# Patient Record
Sex: FEMALE | Race: WHITE | NOT HISPANIC OR LATINO | Employment: OTHER | URBAN - METROPOLITAN AREA
[De-identification: names, ages, dates, MRNs, and addresses within clinical notes are randomized per-mention and may not be internally consistent; named-entity substitution may affect disease eponyms.]

---

## 2017-10-26 ENCOUNTER — HOSPITAL ENCOUNTER (OUTPATIENT)
Dept: FAMILY MEDICINE CLINIC | Facility: CLINIC | Age: 82
Setting detail: SPECIMEN
Discharge: HOME | End: 2017-10-26
Attending: FAMILY MEDICINE | Admitting: FAMILY MEDICINE

## 2017-10-26 LAB
ALBUMIN SERPL-MCNC: 3.7 G/DL (ref 3.5–4.8)
ALBUMIN/GLOB SERPL: 1.1 {RATIO} (ref 1–1.7)
ALP SERPL-CCNC: 79 IU/L (ref 32–91)
ALT SERPL-CCNC: 16 IU/L (ref 14–54)
ANION GAP SERPL CALC-SCNC: 11.3 MMOL/L (ref 10–20)
AST SERPL-CCNC: 23 IU/L (ref 15–41)
BILIRUB SERPL-MCNC: 0.3 MG/DL (ref 0.3–1.2)
BUN SERPL-MCNC: 12 MG/DL (ref 8–20)
BUN/CREAT SERPL: 17.1 (ref 5.4–26.2)
CALCIUM SERPL-MCNC: 9.5 MG/DL (ref 8.9–10.3)
CHLORIDE SERPL-SCNC: 100 MMOL/L (ref 101–111)
CONV CO2: 27 MMOL/L (ref 22–32)
CONV TOTAL PROTEIN: 7 G/DL (ref 6.1–7.9)
CREAT UR-MCNC: 0.7 MG/DL (ref 0.4–1)
GLOBULIN UR ELPH-MCNC: 3.3 G/DL (ref 2.5–3.8)
GLUCOSE SERPL-MCNC: 138 MG/DL (ref 65–99)
POTASSIUM SERPL-SCNC: 4.3 MMOL/L (ref 3.6–5.1)
SODIUM SERPL-SCNC: 134 MMOL/L (ref 136–144)

## 2018-04-24 ENCOUNTER — HOSPITAL ENCOUNTER (OUTPATIENT)
Dept: FAMILY MEDICINE CLINIC | Facility: CLINIC | Age: 83
Setting detail: SPECIMEN
Discharge: HOME | End: 2018-04-24
Attending: FAMILY MEDICINE | Admitting: FAMILY MEDICINE

## 2018-04-24 LAB
ALBUMIN SERPL-MCNC: 3.5 G/DL (ref 3.5–4.8)
ALBUMIN/GLOB SERPL: 1.2 {RATIO} (ref 1–1.7)
ALP SERPL-CCNC: 86 IU/L (ref 32–91)
ALT SERPL-CCNC: 21 IU/L (ref 14–54)
ANION GAP SERPL CALC-SCNC: 10.1 MMOL/L (ref 10–20)
AST SERPL-CCNC: 26 IU/L (ref 15–41)
BASOPHILS # BLD AUTO: 0.1 10*3/UL (ref 0–0.2)
BASOPHILS NFR BLD AUTO: 1 % (ref 0–2)
BILIRUB SERPL-MCNC: 0.4 MG/DL (ref 0.3–1.2)
BUN SERPL-MCNC: 14 MG/DL (ref 8–20)
BUN/CREAT SERPL: 20 (ref 5.4–26.2)
CALCIUM SERPL-MCNC: 9.2 MG/DL (ref 8.9–10.3)
CHLORIDE SERPL-SCNC: 104 MMOL/L (ref 101–111)
CONV CO2: 26 MMOL/L (ref 22–32)
CONV TOTAL PROTEIN: 6.4 G/DL (ref 6.1–7.9)
CREAT UR-MCNC: 0.7 MG/DL (ref 0.4–1)
DIFFERENTIAL METHOD BLD: (no result)
EOSINOPHIL # BLD AUTO: 0.2 10*3/UL (ref 0–0.3)
EOSINOPHIL # BLD AUTO: 3 % (ref 0–3)
ERYTHROCYTE [DISTWIDTH] IN BLOOD BY AUTOMATED COUNT: 14.4 % (ref 11.5–14.5)
GLOBULIN UR ELPH-MCNC: 2.9 G/DL (ref 2.5–3.8)
GLUCOSE SERPL-MCNC: 128 MG/DL (ref 65–99)
HCT VFR BLD AUTO: 37.6 % (ref 35–49)
HGB BLD-MCNC: 12.4 G/DL (ref 12–15)
LYMPHOCYTES # BLD AUTO: 1.6 10*3/UL (ref 0.8–4.8)
LYMPHOCYTES NFR BLD AUTO: 28 % (ref 18–42)
MCH RBC QN AUTO: 29.6 PG (ref 26–32)
MCHC RBC AUTO-ENTMCNC: 33 G/DL (ref 32–36)
MCV RBC AUTO: 89.8 FL (ref 80–94)
MONOCYTES # BLD AUTO: 0.5 10*3/UL (ref 0.1–1.3)
MONOCYTES NFR BLD AUTO: 9 % (ref 2–11)
NEUTROPHILS # BLD AUTO: 3.5 10*3/UL (ref 2.3–8.6)
NEUTROPHILS NFR BLD AUTO: 59 % (ref 50–75)
NRBC BLD AUTO-RTO: 0 /100{WBCS}
NRBC/RBC NFR BLD MANUAL: 0 10*3/UL
PLATELET # BLD AUTO: 191 10*3/UL (ref 150–450)
PMV BLD AUTO: 10.2 FL (ref 7.4–10.4)
POTASSIUM SERPL-SCNC: 4.1 MMOL/L (ref 3.6–5.1)
RBC # BLD AUTO: 4.18 10*6/UL (ref 4–5.4)
SODIUM SERPL-SCNC: 136 MMOL/L (ref 136–144)
WBC # BLD AUTO: 6 10*3/UL (ref 4.5–11.5)

## 2018-08-22 ENCOUNTER — HOSPITAL ENCOUNTER (OUTPATIENT)
Dept: FAMILY MEDICINE CLINIC | Facility: CLINIC | Age: 83
Setting detail: SPECIMEN
Discharge: HOME | End: 2018-08-22
Attending: FAMILY MEDICINE | Admitting: FAMILY MEDICINE

## 2018-08-22 LAB
ALBUMIN SERPL-MCNC: 3.3 G/DL (ref 3.5–4.8)
ALBUMIN/GLOB SERPL: 1 {RATIO} (ref 1–1.7)
ALP SERPL-CCNC: 94 IU/L (ref 32–91)
ALT SERPL-CCNC: 15 IU/L (ref 14–54)
ANION GAP SERPL CALC-SCNC: 10.6 MMOL/L (ref 10–20)
AST SERPL-CCNC: 22 IU/L (ref 15–41)
BASOPHILS # BLD AUTO: 0.1 10*3/UL (ref 0–0.2)
BASOPHILS NFR BLD AUTO: 1 % (ref 0–2)
BILIRUB SERPL-MCNC: 0.8 MG/DL (ref 0.3–1.2)
BILIRUB UR QL STRIP: NEGATIVE MG/DL
BUN SERPL-MCNC: 13 MG/DL (ref 8–20)
BUN/CREAT SERPL: 16.3 (ref 5.4–26.2)
CALCIUM SERPL-MCNC: 9 MG/DL (ref 8.9–10.3)
CASTS URNS QL MICRO: ABNORMAL /[LPF]
CHLORIDE SERPL-SCNC: 103 MMOL/L (ref 101–111)
COLOR UR: ABNORMAL
CONV BACTERIA IN URINE MICRO: NEGATIVE
CONV CLARITY OF URINE: CLEAR
CONV CO2: 26 MMOL/L (ref 22–32)
CONV HYALINE CASTS IN URINE MICRO: 1 /[LPF] (ref 0–5)
CONV PROTEIN IN URINE BY AUTOMATED TEST STRIP: ABNORMAL MG/DL
CONV SMALL ROUND CELLS: ABNORMAL /[HPF]
CONV TOTAL PROTEIN: 6.6 G/DL (ref 6.1–7.9)
CONV UROBILINOGEN IN URINE BY AUTOMATED TEST STRIP: 0.2 MG/DL
CREAT UR-MCNC: 0.8 MG/DL (ref 0.4–1)
CULTURE INDICATED?: ABNORMAL
DIFFERENTIAL METHOD BLD: (no result)
EOSINOPHIL # BLD AUTO: 0.1 10*3/UL (ref 0–0.3)
EOSINOPHIL # BLD AUTO: 2 % (ref 0–3)
ERYTHROCYTE [DISTWIDTH] IN BLOOD BY AUTOMATED COUNT: 13.6 % (ref 11.5–14.5)
GLOBULIN UR ELPH-MCNC: 3.3 G/DL (ref 2.5–3.8)
GLUCOSE SERPL-MCNC: 180 MG/DL (ref 65–99)
GLUCOSE UR QL: NEGATIVE MG/DL
HCT VFR BLD AUTO: 36.6 % (ref 35–49)
HGB BLD-MCNC: 12.5 G/DL (ref 12–15)
HGB UR QL STRIP: NEGATIVE
KETONES UR QL STRIP: NEGATIVE MG/DL
LEUKOCYTE ESTERASE UR QL STRIP: NEGATIVE
LYMPHOCYTES # BLD AUTO: 1.4 10*3/UL (ref 0.8–4.8)
LYMPHOCYTES NFR BLD AUTO: 19 % (ref 18–42)
MCH RBC QN AUTO: 31.2 PG (ref 26–32)
MCHC RBC AUTO-ENTMCNC: 34.2 G/DL (ref 32–36)
MCV RBC AUTO: 91.5 FL (ref 80–94)
MONOCYTES # BLD AUTO: 0.6 10*3/UL (ref 0.1–1.3)
MONOCYTES NFR BLD AUTO: 8 % (ref 2–11)
NEUTROPHILS # BLD AUTO: 5 10*3/UL (ref 2.3–8.6)
NEUTROPHILS NFR BLD AUTO: 70 % (ref 50–75)
NITRITE UR QL STRIP: NEGATIVE
NRBC BLD AUTO-RTO: 0 /100{WBCS}
NRBC/RBC NFR BLD MANUAL: 0 10*3/UL
PH UR STRIP.AUTO: 6 [PH] (ref 4.5–8)
PLATELET # BLD AUTO: 183 10*3/UL (ref 150–450)
PMV BLD AUTO: 10.2 FL (ref 7.4–10.4)
POTASSIUM SERPL-SCNC: 3.6 MMOL/L (ref 3.6–5.1)
RBC # BLD AUTO: 4 10*6/UL (ref 4–5.4)
RBC #/AREA URNS HPF: 3 /[HPF] (ref 0–3)
SODIUM SERPL-SCNC: 136 MMOL/L (ref 136–144)
SP GR UR: 1.02 (ref 1–1.03)
SPERM URNS QL MICRO: ABNORMAL /[HPF]
SQUAMOUS SPT QL MICRO: 2 /[HPF] (ref 0–5)
UNIDENT CRYS URNS QL MICRO: ABNORMAL /[HPF]
WBC # BLD AUTO: 7.1 10*3/UL (ref 4.5–11.5)
WBC #/AREA URNS HPF: 5 /[HPF] (ref 0–5)
YEAST SPEC QL WET PREP: ABNORMAL /[HPF]

## 2018-10-25 ENCOUNTER — HOSPITAL ENCOUNTER (OUTPATIENT)
Dept: FAMILY MEDICINE CLINIC | Facility: CLINIC | Age: 83
Setting detail: SPECIMEN
Discharge: HOME | End: 2018-10-25
Attending: FAMILY MEDICINE | Admitting: FAMILY MEDICINE

## 2018-10-25 LAB
ALBUMIN SERPL-MCNC: 3.7 G/DL (ref 3.5–4.8)
ALBUMIN/GLOB SERPL: 1.2 {RATIO} (ref 1–1.7)
ALP SERPL-CCNC: 102 IU/L (ref 32–91)
ALT SERPL-CCNC: 22 IU/L (ref 14–54)
ANION GAP SERPL CALC-SCNC: 11.8 MMOL/L (ref 10–20)
AST SERPL-CCNC: 24 IU/L (ref 15–41)
BILIRUB SERPL-MCNC: 0.6 MG/DL (ref 0.3–1.2)
BUN SERPL-MCNC: 15 MG/DL (ref 8–20)
BUN/CREAT SERPL: 25 (ref 5.4–26.2)
CALCIUM SERPL-MCNC: 9.2 MG/DL (ref 8.9–10.3)
CHLORIDE SERPL-SCNC: 104 MMOL/L (ref 101–111)
CONV CO2: 26 MMOL/L (ref 22–32)
CONV TOTAL PROTEIN: 6.7 G/DL (ref 6.1–7.9)
CREAT UR-MCNC: 0.6 MG/DL (ref 0.4–1)
GLOBULIN UR ELPH-MCNC: 3 G/DL (ref 2.5–3.8)
GLUCOSE SERPL-MCNC: 139 MG/DL (ref 65–99)
HBA1C MFR BLD: 7.5 % (ref 0–5.6)
POTASSIUM SERPL-SCNC: 3.8 MMOL/L (ref 3.6–5.1)
SODIUM SERPL-SCNC: 138 MMOL/L (ref 136–144)

## 2019-01-10 ENCOUNTER — EPISODE CHANGES (OUTPATIENT)
Dept: CASE MANAGEMENT | Facility: OTHER | Age: 84
End: 2019-01-10

## 2019-01-10 ENCOUNTER — PATIENT OUTREACH (OUTPATIENT)
Dept: CASE MANAGEMENT | Facility: OTHER | Age: 84
End: 2019-01-10

## 2019-01-10 NOTE — OUTREACH NOTE
Skilled Nursing Facility Discharge Flowsheet:     Skilled Nursing Facility Discharge Assessment 1/10/2019   Acute Facility Discharged From [facility]   Acute Discharge Date 1/8/2019   [facility]? [facility]   Purpose of SNF Admission PT;SN   Estimated length of stay for the patient? Discharge Planning Incomplete   Who is the insurance provider or payor of patient stay? Medicare   Progression of Patient? New Admission

## 2019-01-18 ENCOUNTER — PATIENT OUTREACH (OUTPATIENT)
Dept: CASE MANAGEMENT | Facility: OTHER | Age: 84
End: 2019-01-18

## 2019-01-18 NOTE — OUTREACH NOTE
Skilled Nursing Facility Discharge Flowsheet:     Skilled Nursing Facility Discharge Assessment 1/18/2019   Acute Facility Discharged From [facility]   Acute Discharge Date 1/8/2019   [facility] the [facility]   Purpose of SNF Admission PT;SN   Estimated length of stay for the patient? Discharge Planning Incomplete   Who is the insurance provider or payor of patient stay? Medicare   Progression of Patient? Within Medicare Limits

## 2019-01-23 ENCOUNTER — PATIENT OUTREACH (OUTPATIENT)
Dept: CASE MANAGEMENT | Facility: OTHER | Age: 84
End: 2019-01-23

## 2019-01-23 NOTE — OUTREACH NOTE
Skilled Nursing Facility Discharge Flowsheet:     Skilled Nursing Facility Discharge Assessment 1/23/2019   Acute Facility Discharged From [facility]   Acute Discharge Date 1/8/2019   [facility] the [facility]   Purpose of SNF Admission PT;SN   Estimated length of stay for the patient? Discharge Planning Incomplete   Who is the insurance provider or payor of patient stay? Medicare   Progression of Patient? Within Medicare Limits

## 2019-03-22 ENCOUNTER — PATIENT OUTREACH (OUTPATIENT)
Dept: CASE MANAGEMENT | Facility: OTHER | Age: 84
End: 2019-03-22

## 2019-03-22 NOTE — OUTREACH NOTE
Skilled Nursing Facility Discharge Flowsheet:     Skilled Nursing Facility Discharge Assessment 3/22/2019   Acute Facility Discharged From [facility]   Acute Discharge Date 1/8/2019   [facility] the [facility]   Purpose of SNF Admission PT;SN   Estimated length of stay for the patient? Patient no longer residing at facility.   Who is the insurance provider or payor of patient stay? -   Progression of Patient? No one available to provide discharge dispositon to RN-Ca this dte.     RN-CA review of [place] and [facility] - No acute or PCP activity since January 2019.  Last activity was correspondence between PCP and Insurance provider relative to Long Term Care coverage.

## 2019-11-23 ENCOUNTER — APPOINTMENT (OUTPATIENT)
Dept: GENERAL RADIOLOGY | Facility: HOSPITAL | Age: 84
End: 2019-11-23

## 2019-11-23 ENCOUNTER — APPOINTMENT (OUTPATIENT)
Dept: CT IMAGING | Facility: HOSPITAL | Age: 84
End: 2019-11-23

## 2019-11-23 ENCOUNTER — HOSPITAL ENCOUNTER (EMERGENCY)
Facility: HOSPITAL | Age: 84
Discharge: HOME | End: 2019-11-23
Attending: EMERGENCY MEDICINE | Admitting: EMERGENCY MEDICINE

## 2019-11-23 VITALS
DIASTOLIC BLOOD PRESSURE: 78 MMHG | BODY MASS INDEX: 20.28 KG/M2 | TEMPERATURE: 97.4 F | RESPIRATION RATE: 18 BRPM | HEART RATE: 70 BPM | WEIGHT: 110.23 LBS | SYSTOLIC BLOOD PRESSURE: 141 MMHG | OXYGEN SATURATION: 99 % | HEIGHT: 62 IN

## 2019-11-23 DIAGNOSIS — S13.9XXA NECK SPRAIN, INITIAL ENCOUNTER: ICD-10-CM

## 2019-11-23 DIAGNOSIS — S70.01XA CONTUSION OF RIGHT HIP, INITIAL ENCOUNTER: ICD-10-CM
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
## 2019-11-23 DIAGNOSIS — M25.551 RIGHT HIP PAIN: ICD-10-CM

## 2019-11-23 DIAGNOSIS — S09.90XA CLOSED HEAD INJURY WITHOUT LOSS OF CONSCIOUSNESS, INITIAL ENCOUNTER: ICD-10-CM

## 2019-11-23 LAB — GLUCOSE BLDC GLUCOMTR-MCNC: 161 MG/DL (ref 70–105)

## 2019-11-23 PROCEDURE — 72125 CT NECK SPINE W/O DYE: CPT

## 2019-11-23 PROCEDURE — 82962 GLUCOSE BLOOD TEST: CPT

## 2019-11-23 PROCEDURE — 73502 X-RAY EXAM HIP UNI 2-3 VIEWS: CPT

## 2019-11-23 PROCEDURE — 70450 CT HEAD/BRAIN W/O DYE: CPT

## 2019-11-23 PROCEDURE — 99283 EMERGENCY DEPT VISIT LOW MDM: CPT

## 2019-11-23 NOTE — DISCHARGE INSTRUCTIONS
Watch the patient closely returning for any episode of confusion lethargy or repetitive vomiting.  Tylenol or Motrin as needed for pain.  Ice to any areas of pain or contusion on for 15 minutes off for 15 minutes over the next 24 hours.  See PMD in 2 days for recheck.  See PMD in 10 to 14 days if hip pain persist for repeat evaluation to rule out occult fracture or injury.

## 2019-12-25 ENCOUNTER — APPOINTMENT (OUTPATIENT)
Dept: CT IMAGING | Facility: HOSPITAL | Age: 84
End: 2019-12-25

## 2019-12-25 ENCOUNTER — APPOINTMENT (OUTPATIENT)
Dept: GENERAL RADIOLOGY | Facility: HOSPITAL | Age: 84
End: 2019-12-25

## 2019-12-25 ENCOUNTER — HOSPITAL ENCOUNTER (OUTPATIENT)
Facility: HOSPITAL | Age: 84
Setting detail: OBSERVATION
Discharge: HOME HEALTH | End: 2019-12-26
Attending: EMERGENCY MEDICINE | Admitting: HOSPITALIST

## 2019-12-25 DIAGNOSIS — R53.1 GENERAL WEAKNESS: ICD-10-CM
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
## 2019-12-25 DIAGNOSIS — S01.81XA LACERATION OF FOREHEAD, INITIAL ENCOUNTER: ICD-10-CM

## 2019-12-25 PROBLEM — I10 HYPERTENSION: Status: ACTIVE | Noted: 2019-12-25

## 2019-12-25 PROBLEM — E11.9 DIABETES MELLITUS (HCC): Status: ACTIVE | Noted: 2019-12-25

## 2019-12-25 PROBLEM — S09.90XA HEAD INJURY, CLOSED, INITIAL ENCOUNTER: Status: ACTIVE | Noted: 2019-12-25

## 2019-12-25 PROBLEM — F03.90 DEMENTIA (HCC): Status: ACTIVE | Noted: 2019-12-25

## 2019-12-25 LAB
ANION GAP SERPL CALCULATED.3IONS-SCNC: 12 MMOL/L (ref 5–15)
APTT PPP: 22.3 SECONDS (ref 24–31)
BASOPHILS # BLD AUTO: 0.1 10*3/MM3 (ref 0–0.2)
BASOPHILS NFR BLD AUTO: 1.1 % (ref 0–1.5)
BILIRUB UR QL STRIP: NEGATIVE
BUN BLD-MCNC: 12 MG/DL (ref 8–23)
BUN/CREAT SERPL: 17.9 (ref 7–25)
CALCIUM SPEC-SCNC: 9.3 MG/DL (ref 8.2–9.6)
CHLORIDE SERPL-SCNC: 99 MMOL/L (ref 98–107)
CLARITY UR: CLEAR
CO2 SERPL-SCNC: 26 MMOL/L (ref 22–29)
COLOR UR: YELLOW
CREAT BLD-MCNC: 0.67 MG/DL (ref 0.57–1)
DEPRECATED RDW RBC AUTO: 48.6 FL (ref 37–54)
EOSINOPHIL # BLD AUTO: 0.2 10*3/MM3 (ref 0–0.4)
EOSINOPHIL NFR BLD AUTO: 3.6 % (ref 0.3–6.2)
ERYTHROCYTE [DISTWIDTH] IN BLOOD BY AUTOMATED COUNT: 16.2 % (ref 12.3–15.4)
GFR SERPL CREATININE-BSD FRML MDRD: 82 ML/MIN/1.73
GLUCOSE BLD-MCNC: 196 MG/DL (ref 65–99)
GLUCOSE UR STRIP-MCNC: ABNORMAL MG/DL
HCT VFR BLD AUTO: 37 % (ref 34–46.6)
HGB BLD-MCNC: 12.6 G/DL (ref 12–15.9)
HGB UR QL STRIP.AUTO: NEGATIVE
HOLD SPECIMEN: NORMAL
HOLD SPECIMEN: NORMAL
INR PPP: 1.06 (ref 0.9–1.1)
KETONES UR QL STRIP: NEGATIVE
LEUKOCYTE ESTERASE UR QL STRIP.AUTO: NEGATIVE
LYMPHOCYTES # BLD AUTO: 1.1 10*3/MM3 (ref 0.7–3.1)
LYMPHOCYTES NFR BLD AUTO: 19.2 % (ref 19.6–45.3)
MCH RBC QN AUTO: 29.2 PG (ref 26.6–33)
MCHC RBC AUTO-ENTMCNC: 34.2 G/DL (ref 31.5–35.7)
MCV RBC AUTO: 85.4 FL (ref 79–97)
MONOCYTES # BLD AUTO: 0.4 10*3/MM3 (ref 0.1–0.9)
MONOCYTES NFR BLD AUTO: 6.5 % (ref 5–12)
NEUTROPHILS # BLD AUTO: 3.9 10*3/MM3 (ref 1.7–7)
NEUTROPHILS NFR BLD AUTO: 69.6 % (ref 42.7–76)
NITRITE UR QL STRIP: NEGATIVE
NRBC BLD AUTO-RTO: 0.1 /100 WBC (ref 0–0.2)
PH UR STRIP.AUTO: 7.5 [PH] (ref 5–8)
PLATELET # BLD AUTO: 175 10*3/MM3 (ref 140–450)
PMV BLD AUTO: 8.4 FL (ref 6–12)
POTASSIUM BLD-SCNC: 3.9 MMOL/L (ref 3.5–5.2)
PROT UR QL STRIP: NEGATIVE
PROTHROMBIN TIME: 11 SECONDS (ref 9.6–11.7)
RBC # BLD AUTO: 4.33 10*6/MM3 (ref 3.77–5.28)
SODIUM BLD-SCNC: 137 MMOL/L (ref 136–145)
SP GR UR STRIP: 1.01 (ref 1–1.03)
TROPONIN T SERPL-MCNC: <0.01 NG/ML (ref 0–0.03)
UROBILINOGEN UR QL STRIP: ABNORMAL
WBC NRBC COR # BLD: 5.7 10*3/MM3 (ref 3.4–10.8)

## 2019-12-25 PROCEDURE — 85610 PROTHROMBIN TIME: CPT | Performed by: EMERGENCY MEDICINE

## 2019-12-25 PROCEDURE — 90471 IMMUNIZATION ADMIN: CPT | Performed by: EMERGENCY MEDICINE

## 2019-12-25 PROCEDURE — G0378 HOSPITAL OBSERVATION PER HR: HCPCS

## 2019-12-25 PROCEDURE — 93005 ELECTROCARDIOGRAM TRACING: CPT | Performed by: EMERGENCY MEDICINE

## 2019-12-25 PROCEDURE — 70450 CT HEAD/BRAIN W/O DYE: CPT

## 2019-12-25 PROCEDURE — P9612 CATHETERIZE FOR URINE SPEC: HCPCS

## 2019-12-25 PROCEDURE — 99220 PR INITIAL OBSERVATION CARE/DAY 70 MINUTES: CPT | Performed by: HOSPITALIST

## 2019-12-25 PROCEDURE — 25010000002 TDAP 5-2.5-18.5 LF-MCG/0.5 SUSPENSION: Performed by: EMERGENCY MEDICINE

## 2019-12-25 PROCEDURE — 85025 COMPLETE CBC W/AUTO DIFF WBC: CPT | Performed by: EMERGENCY MEDICINE

## 2019-12-25 PROCEDURE — 36415 COLL VENOUS BLD VENIPUNCTURE: CPT

## 2019-12-25 PROCEDURE — 81003 URINALYSIS AUTO W/O SCOPE: CPT | Performed by: EMERGENCY MEDICINE

## 2019-12-25 PROCEDURE — 72125 CT NECK SPINE W/O DYE: CPT

## 2019-12-25 PROCEDURE — 90715 TDAP VACCINE 7 YRS/> IM: CPT | Performed by: EMERGENCY MEDICINE

## 2019-12-25 PROCEDURE — 80048 BASIC METABOLIC PNL TOTAL CA: CPT | Performed by: EMERGENCY MEDICINE

## 2019-12-25 PROCEDURE — 85730 THROMBOPLASTIN TIME PARTIAL: CPT | Performed by: EMERGENCY MEDICINE

## 2019-12-25 PROCEDURE — 99284 EMERGENCY DEPT VISIT MOD MDM: CPT

## 2019-12-25 PROCEDURE — 72170 X-RAY EXAM OF PELVIS: CPT

## 2019-12-25 PROCEDURE — 84484 ASSAY OF TROPONIN QUANT: CPT | Performed by: EMERGENCY MEDICINE

## 2019-12-25 PROCEDURE — 71045 X-RAY EXAM CHEST 1 VIEW: CPT

## 2019-12-25 RX ORDER — ACETAMINOPHEN 325 MG/1
650 TABLET ORAL EVERY 6 HOURS PRN
Status: DISCONTINUED | OUTPATIENT
Start: 2019-12-25 | End: 2019-12-26 | Stop reason: HOSPADM

## 2019-12-25 RX ORDER — BRIMONIDINE TARTRATE 2 MG/ML
1 SOLUTION/ DROPS OPHTHALMIC 2 TIMES DAILY
COMMUNITY

## 2019-12-25 RX ORDER — BRIMONIDINE TARTRATE 2 MG/ML
1 SOLUTION/ DROPS OPHTHALMIC 2 TIMES DAILY
Status: DISCONTINUED | OUTPATIENT
Start: 2019-12-25 | End: 2019-12-26 | Stop reason: HOSPADM

## 2019-12-25 RX ORDER — CHOLECALCIFEROL (VITAMIN D3) 125 MCG
5000 CAPSULE ORAL EVERY MORNING
Status: DISCONTINUED | OUTPATIENT
Start: 2019-12-26 | End: 2019-12-25 | Stop reason: CLARIF

## 2019-12-25 RX ORDER — POLYETHYLENE GLYCOL 3350 17 G/17G
17 POWDER, FOR SOLUTION ORAL DAILY
COMMUNITY
End: 2020-10-17

## 2019-12-25 RX ORDER — ACETAMINOPHEN 500 MG
500 TABLET ORAL EVERY 6 HOURS PRN
Status: DISCONTINUED | OUTPATIENT
Start: 2019-12-25 | End: 2019-12-26 | Stop reason: HOSPADM

## 2019-12-25 RX ORDER — DORZOLAMIDE HYDROCHLORIDE AND TIMOLOL MALEATE 20; 5 MG/ML; MG/ML
1 SOLUTION/ DROPS OPHTHALMIC 2 TIMES DAILY
Status: DISCONTINUED | OUTPATIENT
Start: 2019-12-25 | End: 2019-12-25 | Stop reason: CLARIF

## 2019-12-25 RX ORDER — ONDANSETRON 2 MG/ML
4 INJECTION INTRAMUSCULAR; INTRAVENOUS EVERY 6 HOURS PRN
Status: DISCONTINUED | OUTPATIENT
Start: 2019-12-25 | End: 2019-12-26 | Stop reason: HOSPADM

## 2019-12-25 RX ORDER — AMLODIPINE BESYLATE 5 MG/1
7.5 TABLET ORAL DAILY
Status: DISCONTINUED | OUTPATIENT
Start: 2019-12-25 | End: 2019-12-26 | Stop reason: HOSPADM

## 2019-12-25 RX ORDER — POLYETHYLENE GLYCOL 3350 17 G/17G
17 POWDER, FOR SOLUTION ORAL DAILY PRN
Status: DISCONTINUED | OUTPATIENT
Start: 2019-12-25 | End: 2019-12-26 | Stop reason: HOSPADM

## 2019-12-25 RX ORDER — METFORMIN HYDROCHLORIDE 500 MG/1
500 TABLET, EXTENDED RELEASE ORAL NIGHTLY
Status: DISCONTINUED | OUTPATIENT
Start: 2019-12-25 | End: 2019-12-26 | Stop reason: HOSPADM

## 2019-12-25 RX ORDER — POLYETHYLENE GLYCOL 3350 17 G/17G
17 POWDER, FOR SOLUTION ORAL DAILY
Status: DISCONTINUED | OUTPATIENT
Start: 2019-12-25 | End: 2019-12-26 | Stop reason: HOSPADM

## 2019-12-25 RX ORDER — DONEPEZIL HYDROCHLORIDE 10 MG/1
10 TABLET, FILM COATED ORAL NIGHTLY
COMMUNITY
End: 2020-10-17

## 2019-12-25 RX ORDER — CARBOXYMETHYLCELLULOSE SODIUM 10 MG/ML
1 GEL OPHTHALMIC
Status: DISCONTINUED | OUTPATIENT
Start: 2019-12-25 | End: 2019-12-26 | Stop reason: HOSPADM

## 2019-12-25 RX ORDER — SODIUM CHLORIDE 0.9 % (FLUSH) 0.9 %
10 SYRINGE (ML) INJECTION AS NEEDED
Status: DISCONTINUED | OUTPATIENT
Start: 2019-12-25 | End: 2019-12-26 | Stop reason: HOSPADM

## 2019-12-25 RX ORDER — ACETAMINOPHEN 650 MG/1
650 SUPPOSITORY RECTAL EVERY 4 HOURS PRN
Status: DISCONTINUED | OUTPATIENT
Start: 2019-12-25 | End: 2019-12-26 | Stop reason: HOSPADM

## 2019-12-25 RX ORDER — ATORVASTATIN CALCIUM 10 MG/1
10 TABLET, FILM COATED ORAL DAILY
Status: DISCONTINUED | OUTPATIENT
Start: 2019-12-25 | End: 2019-12-26 | Stop reason: HOSPADM

## 2019-12-25 RX ORDER — ALUMINA, MAGNESIA, AND SIMETHICONE 2400; 2400; 240 MG/30ML; MG/30ML; MG/30ML
15 SUSPENSION ORAL EVERY 6 HOURS PRN
Status: DISCONTINUED | OUTPATIENT
Start: 2019-12-25 | End: 2019-12-26 | Stop reason: HOSPADM

## 2019-12-25 RX ORDER — DONEPEZIL HYDROCHLORIDE 5 MG/1
10 TABLET, FILM COATED ORAL NIGHTLY
Status: DISCONTINUED | OUTPATIENT
Start: 2019-12-25 | End: 2019-12-26 | Stop reason: HOSPADM

## 2019-12-25 RX ORDER — DOCUSATE SODIUM 100 MG/1
100 CAPSULE, LIQUID FILLED ORAL EVERY MORNING
COMMUNITY

## 2019-12-25 RX ORDER — SODIUM CHLORIDE 0.9 % (FLUSH) 0.9 %
10 SYRINGE (ML) INJECTION EVERY 12 HOURS SCHEDULED
Status: DISCONTINUED | OUTPATIENT
Start: 2019-12-25 | End: 2019-12-26 | Stop reason: HOSPADM

## 2019-12-25 RX ORDER — SACCHAROMYCES BOULARDII 250 MG
250 CAPSULE ORAL DAILY
Status: DISCONTINUED | OUTPATIENT
Start: 2019-12-26 | End: 2019-12-26 | Stop reason: HOSPADM

## 2019-12-25 RX ORDER — METFORMIN HYDROCHLORIDE 500 MG/1
500 TABLET, EXTENDED RELEASE ORAL NIGHTLY
COMMUNITY

## 2019-12-25 RX ORDER — CARBOXYMETHYLCELLULOSE SODIUM 5 MG/ML
1 SOLUTION/ DROPS OPHTHALMIC 4 TIMES DAILY
COMMUNITY

## 2019-12-25 RX ORDER — CETIRIZINE HYDROCHLORIDE 10 MG/1
10 TABLET ORAL DAILY
Status: DISCONTINUED | OUTPATIENT
Start: 2019-12-26 | End: 2019-12-26 | Stop reason: HOSPADM

## 2019-12-25 RX ORDER — LATANOPROST 50 UG/ML
1 SOLUTION/ DROPS OPHTHALMIC NIGHTLY
Status: DISCONTINUED | OUTPATIENT
Start: 2019-12-25 | End: 2019-12-26 | Stop reason: HOSPADM

## 2019-12-25 RX ORDER — ACETAMINOPHEN 325 MG/1
650 TABLET ORAL 2 TIMES DAILY
COMMUNITY

## 2019-12-25 RX ORDER — ONDANSETRON 4 MG/1
4 TABLET, FILM COATED ORAL EVERY 6 HOURS PRN
Status: DISCONTINUED | OUTPATIENT
Start: 2019-12-25 | End: 2019-12-26 | Stop reason: HOSPADM

## 2019-12-25 RX ORDER — ACETAMINOPHEN 160 MG/5ML
650 SOLUTION ORAL EVERY 4 HOURS PRN
Status: DISCONTINUED | OUTPATIENT
Start: 2019-12-25 | End: 2019-12-26 | Stop reason: HOSPADM

## 2019-12-25 RX ORDER — DORZOLAMIDE HCL 20 MG/ML
1 SOLUTION/ DROPS OPHTHALMIC 2 TIMES DAILY
Status: DISCONTINUED | OUTPATIENT
Start: 2019-12-25 | End: 2019-12-26 | Stop reason: HOSPADM

## 2019-12-25 RX ORDER — TIMOLOL MALEATE 5 MG/ML
1 SOLUTION/ DROPS OPHTHALMIC EVERY 12 HOURS SCHEDULED
Status: DISCONTINUED | OUTPATIENT
Start: 2019-12-25 | End: 2019-12-26 | Stop reason: HOSPADM

## 2019-12-25 RX ORDER — SIMVASTATIN 20 MG
20 TABLET ORAL NIGHTLY
COMMUNITY
End: 2020-10-17

## 2019-12-25 RX ORDER — PANTOPRAZOLE SODIUM 40 MG/1
40 TABLET, DELAYED RELEASE ORAL EVERY MORNING
Status: DISCONTINUED | OUTPATIENT
Start: 2019-12-26 | End: 2019-12-26 | Stop reason: HOSPADM

## 2019-12-25 RX ORDER — POLYETHYLENE GLYCOL 3350 17 G/17G
17 POWDER, FOR SOLUTION ORAL DAILY PRN
COMMUNITY

## 2019-12-25 RX ORDER — SACCHAROMYCES BOULARDII 250 MG
250 CAPSULE ORAL DAILY
COMMUNITY

## 2019-12-25 RX ORDER — MELATONIN
2000 DAILY
Status: DISCONTINUED | OUTPATIENT
Start: 2019-12-26 | End: 2019-12-26 | Stop reason: HOSPADM

## 2019-12-25 RX ORDER — AMLODIPINE BESYLATE 5 MG/1
7.5 TABLET ORAL DAILY
COMMUNITY

## 2019-12-25 RX ORDER — DORZOLAMIDE HYDROCHLORIDE AND TIMOLOL MALEATE 20; 5 MG/ML; MG/ML
1 SOLUTION/ DROPS OPHTHALMIC 2 TIMES DAILY
COMMUNITY

## 2019-12-25 RX ORDER — LORATADINE 10 MG/1
10 TABLET ORAL DAILY
COMMUNITY

## 2019-12-25 RX ORDER — HYDROCORTISONE ACETATE 25 MG/1
12.5 SUPPOSITORY RECTAL 2 TIMES DAILY
Status: DISCONTINUED | OUTPATIENT
Start: 2019-12-25 | End: 2019-12-26 | Stop reason: HOSPADM

## 2019-12-25 RX ORDER — OMEPRAZOLE 40 MG/1
40 CAPSULE, DELAYED RELEASE ORAL DAILY
COMMUNITY

## 2019-12-25 RX ORDER — DOCUSATE SODIUM 100 MG/1
100 CAPSULE, LIQUID FILLED ORAL EVERY MORNING
Status: DISCONTINUED | OUTPATIENT
Start: 2019-12-26 | End: 2019-12-26 | Stop reason: HOSPADM

## 2019-12-25 RX ORDER — ACETAMINOPHEN 500 MG
500 TABLET ORAL EVERY 6 HOURS PRN
COMMUNITY

## 2019-12-25 RX ORDER — ACETAMINOPHEN 325 MG/1
650 TABLET ORAL EVERY 4 HOURS PRN
Status: DISCONTINUED | OUTPATIENT
Start: 2019-12-25 | End: 2019-12-26 | Stop reason: HOSPADM

## 2019-12-25 RX ADMIN — ATORVASTATIN CALCIUM 10 MG: 10 TABLET, FILM COATED ORAL at 20:53

## 2019-12-25 RX ADMIN — POLYETHYLENE GLYCOL 3350 17 G: 17 POWDER, FOR SOLUTION ORAL at 20:53

## 2019-12-25 RX ADMIN — TETANUS TOXOID, REDUCED DIPHTHERIA TOXOID AND ACELLULAR PERTUSSIS VACCINE, ADSORBED 0.5 ML: 5; 2.5; 8; 8; 2.5 SUSPENSION INTRAMUSCULAR at 15:04

## 2019-12-25 RX ADMIN — LATANOPROST 1 DROP: 50 SOLUTION/ DROPS OPHTHALMIC at 21:31

## 2019-12-25 RX ADMIN — HYDROCORTISONE ACETATE 12.5 MG: 25 SUPPOSITORY RECTAL at 21:30

## 2019-12-25 RX ADMIN — AMLODIPINE BESYLATE 7.5 MG: 5 TABLET ORAL at 20:53

## 2019-12-25 RX ADMIN — DONEPEZIL HYDROCHLORIDE 10 MG: 5 TABLET, FILM COATED ORAL at 21:29

## 2019-12-25 RX ADMIN — TIMOLOL MALEATE 1 DROP: 5 SOLUTION/ DROPS OPHTHALMIC at 21:33

## 2019-12-25 RX ADMIN — DORZOLAMIDE HYDROCHLORIDE 1 DROP: 20 SOLUTION/ DROPS OPHTHALMIC at 21:29

## 2019-12-25 RX ADMIN — Medication 10 ML: at 21:32

## 2019-12-25 RX ADMIN — CARBOXYMETHYLCELLULOSE SODIUM 1 DROP: 10 GEL OPHTHALMIC at 21:28

## 2019-12-25 RX ADMIN — METFORMIN HYDROCHLORIDE 500 MG: 500 TABLET, EXTENDED RELEASE ORAL at 21:32

## 2019-12-25 RX ADMIN — BRIMONIDINE TARTRATE 1 DROP: 2 SOLUTION OPHTHALMIC at 21:25

## 2019-12-26 ENCOUNTER — APPOINTMENT (OUTPATIENT)
Dept: CT IMAGING | Facility: HOSPITAL | Age: 84
End: 2019-12-26

## 2019-12-26 VITALS
DIASTOLIC BLOOD PRESSURE: 63 MMHG | BODY MASS INDEX: 21.83 KG/M2 | HEART RATE: 71 BPM | WEIGHT: 118.61 LBS | HEIGHT: 62 IN | OXYGEN SATURATION: 97 % | SYSTOLIC BLOOD PRESSURE: 111 MMHG | RESPIRATION RATE: 17 BRPM | TEMPERATURE: 98 F

## 2019-12-26 LAB
GLUCOSE BLDC GLUCOMTR-MCNC: 176 MG/DL (ref 70–105)
GLUCOSE BLDC GLUCOMTR-MCNC: 184 MG/DL (ref 70–105)

## 2019-12-26 PROCEDURE — 99217 PR OBSERVATION CARE DISCHARGE MANAGEMENT: CPT | Performed by: HOSPITALIST

## 2019-12-26 PROCEDURE — G0378 HOSPITAL OBSERVATION PER HR: HCPCS

## 2019-12-26 PROCEDURE — 82962 GLUCOSE BLOOD TEST: CPT

## 2019-12-26 PROCEDURE — 70450 CT HEAD/BRAIN W/O DYE: CPT

## 2019-12-26 PROCEDURE — 97165 OT EVAL LOW COMPLEX 30 MIN: CPT

## 2019-12-26 RX ADMIN — AMLODIPINE BESYLATE 7.5 MG: 5 TABLET ORAL at 10:08

## 2019-12-26 RX ADMIN — BRIMONIDINE TARTRATE 1 DROP: 2 SOLUTION OPHTHALMIC at 10:11

## 2019-12-26 RX ADMIN — CARBOXYMETHYLCELLULOSE SODIUM 1 DROP: 10 GEL OPHTHALMIC at 05:32

## 2019-12-26 RX ADMIN — Medication 250 MG: at 10:10

## 2019-12-26 RX ADMIN — CETIRIZINE HYDROCHLORIDE 10 MG: 10 TABLET, FILM COATED ORAL at 10:10

## 2019-12-26 RX ADMIN — DORZOLAMIDE HYDROCHLORIDE 1 DROP: 20 SOLUTION/ DROPS OPHTHALMIC at 10:10

## 2019-12-26 RX ADMIN — CARBOXYMETHYLCELLULOSE SODIUM 1 DROP: 10 GEL OPHTHALMIC at 14:18

## 2019-12-26 RX ADMIN — ATORVASTATIN CALCIUM 10 MG: 10 TABLET, FILM COATED ORAL at 10:10

## 2019-12-26 RX ADMIN — MELATONIN 2000 UNITS: at 10:08

## 2019-12-26 RX ADMIN — SORBITOL SOLUTION (BULK) 400 ML: 70 SOLUTION at 11:05

## 2019-12-26 RX ADMIN — CARBOXYMETHYLCELLULOSE SODIUM 1 DROP: 10 GEL OPHTHALMIC at 10:11

## 2019-12-26 RX ADMIN — Medication 10 ML: at 10:12

## 2019-12-26 RX ADMIN — DOCUSATE SODIUM 100 MG: 100 CAPSULE, LIQUID FILLED ORAL at 06:26

## 2019-12-26 RX ADMIN — PANTOPRAZOLE SODIUM 40 MG: 40 TABLET, DELAYED RELEASE ORAL at 06:27

## 2019-12-26 RX ADMIN — POLYETHYLENE GLYCOL 3350 17 G: 17 POWDER, FOR SOLUTION ORAL at 10:11

## 2019-12-26 RX ADMIN — TIMOLOL MALEATE 1 DROP: 5 SOLUTION/ DROPS OPHTHALMIC at 10:11

## 2019-12-27 ENCOUNTER — READMISSION MANAGEMENT (OUTPATIENT)
Dept: CALL CENTER | Facility: HOSPITAL | Age: 84
End: 2019-12-27

## 2019-12-27 NOTE — OUTREACH NOTE
Prep Survey      Responses   Facility patient discharged from?  [facility]   Is patient eligible?  Yes   Discharge diagnosis  sp fall, generalized weakness, closed head injury, dementia, HTN, DM   Does the patient have one of the following disease processes/diagnoses(primary or secondary)?  Other   Does the patient have Home health ordered?  Yes   What is the Home health agency?   Lives at [facility] [address] [facility] [address],  [facility]   Is there a DME ordered?  No   Prep survey completed?  Yes          Ness Salazar RN

## 2019-12-30 ENCOUNTER — READMISSION MANAGEMENT (OUTPATIENT)
Dept: CALL CENTER | Facility: HOSPITAL | Age: 84
End: 2019-12-30

## 2019-12-30 NOTE — OUTREACH NOTE
Medical Week 1 Survey      Responses   Facility patient discharged from?  [facility]   Does the patient have one of the following disease processes/diagnoses(primary or secondary)?  Other   Is there a successful TCM telephone encounter documented?  No   Week 1 attempt successful?  No   Rescheduled  Revoked   Revoke  Decline to participate [RESIDES AT [facility]]          Chrissie Araujo LPN

## 2019-12-31 ENCOUNTER — EPISODE CHANGES (OUTPATIENT)
Dept: CASE MANAGEMENT | Facility: OTHER | Age: 84
End: 2019-12-31

## 2020-01-03 ENCOUNTER — EPISODE CHANGES (OUTPATIENT)
Dept: CASE MANAGEMENT | Facility: OTHER | Age: 85
End: 2020-01-03

## 2020-01-17 ENCOUNTER — EPISODE CHANGES (OUTPATIENT)
Dept: CASE MANAGEMENT | Facility: OTHER | Age: 85
End: 2020-01-17

## 2020-01-22 ENCOUNTER — EPISODE CHANGES (OUTPATIENT)
Dept: CASE MANAGEMENT | Facility: OTHER | Age: 85
End: 2020-01-22

## 2020-01-30 ENCOUNTER — TELEPHONE (OUTPATIENT)
Dept: FAMILY MEDICINE CLINIC | Facility: CLINIC | Age: 85
End: 2020-01-30

## 2020-01-30 ENCOUNTER — EPISODE CHANGES (OUTPATIENT)
Dept: CASE MANAGEMENT | Facility: OTHER | Age: 85
End: 2020-01-30

## 2020-01-30 DIAGNOSIS — R62.51 FAILURE TO THRIVE (0-17): Primary | ICD-10-CM

## 2020-06-05 ENCOUNTER — HOSPITAL ENCOUNTER (EMERGENCY)
Facility: HOSPITAL | Age: 85
Discharge: HOME | End: 2020-06-05
Attending: EMERGENCY MEDICINE | Admitting: EMERGENCY MEDICINE

## 2020-06-05 ENCOUNTER — APPOINTMENT (OUTPATIENT)
Dept: CT IMAGING | Facility: HOSPITAL | Age: 85
End: 2020-06-05

## 2020-06-05 VITALS
TEMPERATURE: 98.3 F | BODY MASS INDEX: 20.49 KG/M2 | DIASTOLIC BLOOD PRESSURE: 81 MMHG | HEIGHT: 62 IN | OXYGEN SATURATION: 100 % | HEART RATE: 75 BPM | RESPIRATION RATE: 18 BRPM | SYSTOLIC BLOOD PRESSURE: 139 MMHG | WEIGHT: 111.33 LBS

## 2020-06-05 DIAGNOSIS — S00.03XA CONTUSION OF SCALP, INITIAL ENCOUNTER: Primary | ICD-10-CM

## 2020-06-05 DIAGNOSIS — S01.81XA LACERATION OF FOREHEAD, INITIAL ENCOUNTER: ICD-10-CM

## 2020-06-05 PROCEDURE — 70450 CT HEAD/BRAIN W/O DYE: CPT

## 2020-06-05 PROCEDURE — 99284 EMERGENCY DEPT VISIT MOD MDM: CPT

## 2020-06-05 NOTE — DISCHARGE INSTRUCTIONS
Sutures are absorbable but may be removed after 5 days.  Apply antibiotic ointment to the wound 3 times a day,

## 2020-06-05 NOTE — ED PROVIDER NOTES
Subjective   History of Present Illness  89-year-old female presents for evaluation of fall and a laceration.  She states she did not lose consciousness or pass out.  She is not sure exactly why she fell.  She does not complain of any neck chest abdomen back or extremity pain at this time.  Injury occurred shortly before arrival.  History is somewhat limited due to dementia.  Review of Systems  Review of systems limited due to dementia however she has no complaints of neck chest abdomen or extremity pain.  Past Medical History:   Diagnosis Date   • Chronic back pain    • Dementia (CMS/HCC)    • Diabetes mellitus (CMS/HCC)    • Hypertension    • Subarachnoid hemorrhage (CMS/HCC)    • Subdural hematoma (CMS/HCC)    • TBI (traumatic brain injury) (CMS/HCC)    • Vertebral fracture, osteoporotic (CMS/HCC)        Allergies   Allergen Reactions   • Denosumab Itching       History reviewed. No pertinent surgical history.    History reviewed. No pertinent family history.    Social History     Socioeconomic History   • Marital status:      Spouse name: Not on file   • Number of children: Not on file   • Years of education: Not on file   • Highest education level: Not on file   Tobacco Use   • Smoking status: Never Smoker   • Smokeless tobacco: Never Used     Prior to Admission medications    Medication Sig Start Date End Date Taking? Authorizing Provider   acetaminophen (TYLENOL) 325 MG tablet Take 650 mg by mouth 2 (Two) Times a Day.    Olivia De La Cruz MD   acetaminophen (TYLENOL) 500 MG tablet Take 500 mg by mouth Every 6 (Six) Hours As Needed for Mild Pain .    Olivia De La Cruz MD   amLODIPine (NORVASC) 5 MG tablet Take 7.5 mg by mouth Daily.    Olivia De La Cruz MD   bimatoprost (LUMIGAN) 0.01 % ophthalmic drops Administer 1 drop to both eyes Every Night.    Olivia De La Cruz MD   brimonidine (ALPHAGAN) 0.2 % ophthalmic solution Administer 1 drop to both eyes 2 (Two) Times a Day.    Provider  MD Olivia   carboxymethylcellulose (REFRESH TEARS) 0.5 % solution Administer 1 drop to both eyes 4 (Four) Times a Day. (08:00,12:00,16:00,20:00)    Olivia De La Cruz MD   Cholecalciferol 50 MCG (2000 UT) capsule Take 2,000 Units by mouth Every Morning.    Olivia De La Cruz MD   docusate sodium (COLACE) 100 MG capsule Take 100 mg by mouth Every Morning.    Olivia De La Cruz MD   donepezil (ARICEPT) 10 MG tablet Take 10 mg by mouth Every Night.    Olivia De La Cruz MD   dorzolamide-timolol (COSOPT) 22.3-6.8 MG/ML ophthalmic solution Administer 1 drop to both eyes 2 (Two) Times a Day.    Olivia De La Cruz MD   hydrocortisone (ANUSOL-HC) 2.5 % rectal cream Insert 1 application into the rectum 2 (Two) Times a Day As Needed for Hemorrhoids.    Olivia De La Cruz MD   loratadine (CLARITIN) 10 MG tablet Take 10 mg by mouth Daily.    Olivia De La Cruz MD   metFORMIN ER (GLUCOPHAGE-XR) 500 MG 24 hr tablet Take 500 mg by mouth Every Night.    Olivia De La Cruz MD   omeprazole (priLOSEC) 40 MG capsule Take 40 mg by mouth Daily.    Olivia De La Cruz MD   polyethylene glycol (MIRALAX) packet Take 17 g by mouth Daily.    Olivia De La Cruz MD   polyethylene glycol (MIRALAX) packet Take 17 g by mouth Daily As Needed (constipation).    Olivia De La Cruz MD   saccharomyces boulardii (FLORASTOR) 250 MG capsule Take 250 mg by mouth Daily.    Olivia De La Cruz MD   simvastatin (ZOCOR) 20 MG tablet Take 20 mg by mouth Every Night.    Olivia De La Cruz MD           Objective   Physical Exam  89-year-old female awake alert.  Generally well-developed well-nourished for age.  She has laceration to forehead.  She does not complain of cervical thoracic or lumbar spine tenderness with percussion.  Chest clear equal breath sounds nontender cardiovascular regular rhythm.  Abdomen soft nontender she has intact movement of all 4 extremities without complaints of pain.  Neurologic exam Alina  Coma Scale 14 she is not oriented to month or year  Laceration Repair  Date/Time: 6/5/2020 5:06 PM  Performed by: Evan Rao MD  Authorized by: Evan Rao MD     Anesthesia (see MAR for exact dosages):     Anesthesia method:  Local infiltration    Local anesthetic:  Lidocaine 1% WITH epi  Laceration details:     Location:  Face    Face location:  Forehead    Length (cm):  2.5  Repair type:     Repair type:  Intermediate  Pre-procedure details:     Preparation:  Patient was prepped and draped in usual sterile fashion  Exploration:     Wound exploration: entire depth of wound probed and visualized      Contaminated: no    Treatment:     Area cleansed with:  Becky-Paula    Amount of cleaning:  Standard    Irrigation solution:  Sterile saline  Subcutaneous repair:     Suture size:  5-0    Suture material:  Vicryl    Suture technique:  Simple interrupted  Skin repair:     Repair method:  Sutures    Suture size:  5-0    Suture material:  Plain gut  Approximation:     Approximation:  Close  Post-procedure details:     Dressing:  Antibiotic ointment and sterile dressing  Comments:      Patient's wound infiltrated 1% Xylocaine with epinephrine cleansed irrigated draped explored.  No fracture or foreign body noted.  Patient skin is very thin.  Wound was closed with deep sutures with 5-0 Vicryl wound was T-shaped.  Devitalized skin was debrided and was then closed with interrupted 5-0 plain gut sutures               ED Course                                           MDM   Chart review: Patient has had previous office visits for leg laceration December 2019 comorbidity: As per past history  Differential: Laceration, closed head injury, fracture, contusion  My EKG interpretation:   Lab:   Radiology: I reviewed CT scan of head.  No acute intracranial abnormality is noted.  There is mild to moderate atrophy and chronic microvascular disease.  Discussion/treatment: Repeat evaluation no new complaints are noted.   Patient was discharged with usual wound care instructions head and precautions.  She was returned to extended care facility.  Patient was evaluated using appropriate PPE      Final diagnoses:   Contusion of scalp, initial encounter   Laceration of forehead, initial encounter            Evan Rao MD  06/05/20 [id number]

## 2020-10-17 ENCOUNTER — APPOINTMENT (OUTPATIENT)
Dept: GENERAL RADIOLOGY | Facility: HOSPITAL | Age: 85
End: 2020-10-17

## 2020-10-17 ENCOUNTER — HOSPITAL ENCOUNTER (OUTPATIENT)
Facility: HOSPITAL | Age: 85
Setting detail: OBSERVATION
Discharge: NURSING, REHAB OR LONG-TERM CARE | End: 2020-10-19
Attending: EMERGENCY MEDICINE | Admitting: HOSPITALIST

## 2020-10-17 ENCOUNTER — APPOINTMENT (OUTPATIENT)
Dept: CT IMAGING | Facility: HOSPITAL | Age: 85
End: 2020-10-17
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
## 2020-10-17 DIAGNOSIS — S00.03XA CONTUSION OF SCALP, INITIAL ENCOUNTER: ICD-10-CM

## 2020-10-17 DIAGNOSIS — S42.292A OTHER CLOSED DISPLACED FRACTURE OF PROXIMAL END OF LEFT HUMERUS, INITIAL ENCOUNTER: ICD-10-CM

## 2020-10-17 DIAGNOSIS — R55 SYNCOPE, UNSPECIFIED SYNCOPE TYPE: ICD-10-CM

## 2020-10-17 PROBLEM — R35.0 INCREASED FREQUENCY OF URINATION: Status: ACTIVE | Noted: 2019-01-04

## 2020-10-17 PROBLEM — S42.302A LEFT HUMERAL FRACTURE: Status: ACTIVE | Noted: 2020-10-17

## 2020-10-17 PROBLEM — F03.90 DEMENTIA (HCC): Chronic | Status: ACTIVE | Noted: 2019-12-25

## 2020-10-17 PROBLEM — R73.9 HYPERGLYCEMIA: Status: ACTIVE | Noted: 2020-10-17

## 2020-10-17 PROBLEM — E11.9 DIABETES MELLITUS (HCC): Chronic | Status: ACTIVE | Noted: 2019-12-25

## 2020-10-17 PROBLEM — N39.0 UTI (URINARY TRACT INFECTION): Status: ACTIVE | Noted: 2020-10-17

## 2020-10-17 PROBLEM — E66.01 MORBID OBESITY (HCC): Chronic | Status: ACTIVE | Noted: 2020-10-17

## 2020-10-17 PROBLEM — R91.1 LUNG NODULE: Status: ACTIVE | Noted: 2018-08-24

## 2020-10-17 PROBLEM — I10 ESSENTIAL HYPERTENSION: Chronic | Status: ACTIVE | Noted: 2019-12-25

## 2020-10-17 PROBLEM — K59.09 CHRONIC CONSTIPATION: Chronic | Status: ACTIVE | Noted: 2020-10-17

## 2020-10-17 PROBLEM — K21.9 GERD (GASTROESOPHAGEAL REFLUX DISEASE): Chronic | Status: ACTIVE | Noted: 2020-10-17

## 2020-10-17 PROBLEM — F41.9 ANXIETY: Chronic | Status: ACTIVE | Noted: 2020-10-17

## 2020-10-17 LAB
ALBUMIN SERPL-MCNC: 4 G/DL (ref 3.5–5.2)
ALBUMIN/GLOB SERPL: 1.3 G/DL
ALP SERPL-CCNC: 140 U/L (ref 39–117)
ALT SERPL W P-5'-P-CCNC: 11 U/L (ref 1–33)
ANION GAP SERPL CALCULATED.3IONS-SCNC: 12 MMOL/L (ref 5–15)
AST SERPL-CCNC: 18 U/L (ref 1–32)
B PARAPERT DNA SPEC QL NAA+PROBE: NOT DETECTED
B PERT DNA SPEC QL NAA+PROBE: NOT DETECTED
BACTERIA UR QL AUTO: ABNORMAL /HPF
BASOPHILS # BLD AUTO: 0 10*3/MM3 (ref 0–0.2)
BASOPHILS NFR BLD AUTO: 0.6 % (ref 0–1.5)
BILIRUB SERPL-MCNC: 0.3 MG/DL (ref 0–1.2)
BILIRUB UR QL STRIP: NEGATIVE
BUN SERPL-MCNC: 11 MG/DL (ref 8–23)
BUN/CREAT SERPL: 17.2 (ref 7–25)
C PNEUM DNA NPH QL NAA+NON-PROBE: NOT DETECTED
CALCIUM SPEC-SCNC: 9.3 MG/DL (ref 8.2–9.6)
CHLORIDE SERPL-SCNC: 101 MMOL/L (ref 98–107)
CK SERPL-CCNC: 79 U/L (ref 20–180)
CLARITY UR: CLEAR
CO2 SERPL-SCNC: 25 MMOL/L (ref 22–29)
COLOR UR: YELLOW
CREAT SERPL-MCNC: 0.64 MG/DL (ref 0.57–1)
DEPRECATED RDW RBC AUTO: 45.5 FL (ref 37–54)
EOSINOPHIL # BLD AUTO: 0.1 10*3/MM3 (ref 0–0.4)
EOSINOPHIL NFR BLD AUTO: 1.2 % (ref 0.3–6.2)
ERYTHROCYTE [DISTWIDTH] IN BLOOD BY AUTOMATED COUNT: 14.7 % (ref 12.3–15.4)
FLUAV H1 2009 PAND RNA NPH QL NAA+PROBE: NOT DETECTED
FLUAV H1 HA GENE NPH QL NAA+PROBE: NOT DETECTED
FLUAV H3 RNA NPH QL NAA+PROBE: NOT DETECTED
FLUAV SUBTYP SPEC NAA+PROBE: NOT DETECTED
FLUBV RNA ISLT QL NAA+PROBE: NOT DETECTED
GFR SERPL CREATININE-BSD FRML MDRD: 86 ML/MIN/1.73
GLOBULIN UR ELPH-MCNC: 3 GM/DL
GLUCOSE BLDC GLUCOMTR-MCNC: 213 MG/DL (ref 70–105)
GLUCOSE BLDC GLUCOMTR-MCNC: 256 MG/DL (ref 70–105)
GLUCOSE SERPL-MCNC: 269 MG/DL (ref 65–99)
GLUCOSE UR STRIP-MCNC: ABNORMAL MG/DL
HADV DNA SPEC NAA+PROBE: NOT DETECTED
HCOV 229E RNA SPEC QL NAA+PROBE: NOT DETECTED
HCOV HKU1 RNA SPEC QL NAA+PROBE: NOT DETECTED
HCOV NL63 RNA SPEC QL NAA+PROBE: NOT DETECTED
HCOV OC43 RNA SPEC QL NAA+PROBE: NOT DETECTED
HCT VFR BLD AUTO: 37.9 % (ref 34–46.6)
HGB BLD-MCNC: 12.7 G/DL (ref 12–15.9)
HGB UR QL STRIP.AUTO: NEGATIVE
HMPV RNA NPH QL NAA+NON-PROBE: NOT DETECTED
HPIV1 RNA SPEC QL NAA+PROBE: NOT DETECTED
HPIV2 RNA SPEC QL NAA+PROBE: NOT DETECTED
HPIV3 RNA NPH QL NAA+PROBE: NOT DETECTED
HPIV4 P GENE NPH QL NAA+PROBE: NOT DETECTED
HYALINE CASTS UR QL AUTO: ABNORMAL /LPF
KETONES UR QL STRIP: NEGATIVE
LEUKOCYTE ESTERASE UR QL STRIP.AUTO: NEGATIVE
LYMPHOCYTES # BLD AUTO: 0.9 10*3/MM3 (ref 0.7–3.1)
LYMPHOCYTES NFR BLD AUTO: 16.6 % (ref 19.6–45.3)
M PNEUMO IGG SER IA-ACNC: NOT DETECTED
MCH RBC QN AUTO: 30 PG (ref 26.6–33)
MCHC RBC AUTO-ENTMCNC: 33.5 G/DL (ref 31.5–35.7)
MCV RBC AUTO: 89.7 FL (ref 79–97)
MONOCYTES # BLD AUTO: 0.3 10*3/MM3 (ref 0.1–0.9)
MONOCYTES NFR BLD AUTO: 5.9 % (ref 5–12)
NEUTROPHILS NFR BLD AUTO: 4 10*3/MM3 (ref 1.7–7)
NEUTROPHILS NFR BLD AUTO: 75.7 % (ref 42.7–76)
NITRITE UR QL STRIP: POSITIVE
NRBC BLD AUTO-RTO: 0 /100 WBC (ref 0–0.2)
PH UR STRIP.AUTO: 7 [PH] (ref 5–8)
PLATELET # BLD AUTO: 175 10*3/MM3 (ref 140–450)
PMV BLD AUTO: 8.3 FL (ref 6–12)
POTASSIUM SERPL-SCNC: 3.7 MMOL/L (ref 3.5–5.2)
PROT SERPL-MCNC: 7 G/DL (ref 6–8.5)
PROT UR QL STRIP: NEGATIVE
RBC # BLD AUTO: 4.22 10*6/MM3 (ref 3.77–5.28)
RBC # UR: ABNORMAL /HPF
REF LAB TEST METHOD: ABNORMAL
RHINOVIRUS RNA SPEC NAA+PROBE: NOT DETECTED
RSV RNA NPH QL NAA+NON-PROBE: NOT DETECTED
SARS-COV-2 RNA NPH QL NAA+NON-PROBE: NOT DETECTED
SODIUM SERPL-SCNC: 138 MMOL/L (ref 136–145)
SP GR UR STRIP: 1.02 (ref 1–1.03)
SQUAMOUS #/AREA URNS HPF: ABNORMAL /HPF
TROPONIN T SERPL-MCNC: <0.01 NG/ML (ref 0–0.03)
TROPONIN T SERPL-MCNC: <0.01 NG/ML (ref 0–0.03)
TSH SERPL DL<=0.05 MIU/L-ACNC: 3.49 UIU/ML (ref 0.27–4.2)
UROBILINOGEN UR QL STRIP: ABNORMAL
WBC # BLD AUTO: 5.3 10*3/MM3 (ref 3.4–10.8)
WBC UR QL AUTO: ABNORMAL /HPF

## 2020-10-17 PROCEDURE — 73060 X-RAY EXAM OF HUMERUS: CPT

## 2020-10-17 PROCEDURE — 25010000002 ONDANSETRON PER 1 MG: Performed by: EMERGENCY MEDICINE

## 2020-10-17 PROCEDURE — P9612 CATHETERIZE FOR URINE SPEC: HCPCS

## 2020-10-17 PROCEDURE — 73200 CT UPPER EXTREMITY W/O DYE: CPT

## 2020-10-17 PROCEDURE — 87147 CULTURE TYPE IMMUNOLOGIC: CPT | Performed by: EMERGENCY MEDICINE

## 2020-10-17 PROCEDURE — 70450 CT HEAD/BRAIN W/O DYE: CPT

## 2020-10-17 PROCEDURE — 87086 URINE CULTURE/COLONY COUNT: CPT | Performed by: EMERGENCY MEDICINE

## 2020-10-17 PROCEDURE — 72125 CT NECK SPINE W/O DYE: CPT

## 2020-10-17 PROCEDURE — G0378 HOSPITAL OBSERVATION PER HR: HCPCS

## 2020-10-17 PROCEDURE — 25010000002 CEFTRIAXONE IN SWFI 1 GRAM/10ML IV PUSH SYRINGE (SIMPLE): Performed by: EMERGENCY MEDICINE

## 2020-10-17 PROCEDURE — 96376 TX/PRO/DX INJ SAME DRUG ADON: CPT

## 2020-10-17 PROCEDURE — 63710000001 INSULIN LISPRO (HUMAN) PER 5 UNITS: Performed by: PHYSICIAN ASSISTANT

## 2020-10-17 PROCEDURE — 96374 THER/PROPH/DIAG INJ IV PUSH: CPT

## 2020-10-17 PROCEDURE — 73030 X-RAY EXAM OF SHOULDER: CPT

## 2020-10-17 PROCEDURE — 85025 COMPLETE CBC W/AUTO DIFF WBC: CPT | Performed by: EMERGENCY MEDICINE

## 2020-10-17 PROCEDURE — 84484 ASSAY OF TROPONIN QUANT: CPT | Performed by: PHYSICIAN ASSISTANT

## 2020-10-17 PROCEDURE — 71045 X-RAY EXAM CHEST 1 VIEW: CPT

## 2020-10-17 PROCEDURE — 80053 COMPREHEN METABOLIC PANEL: CPT | Performed by: EMERGENCY MEDICINE

## 2020-10-17 PROCEDURE — 82550 ASSAY OF CK (CPK): CPT | Performed by: EMERGENCY MEDICINE

## 2020-10-17 PROCEDURE — 25010000002 MORPHINE PER 10 MG: Performed by: EMERGENCY MEDICINE

## 2020-10-17 PROCEDURE — 84443 ASSAY THYROID STIM HORMONE: CPT | Performed by: PHYSICIAN ASSISTANT

## 2020-10-17 PROCEDURE — 84484 ASSAY OF TROPONIN QUANT: CPT | Performed by: EMERGENCY MEDICINE

## 2020-10-17 PROCEDURE — 0202U NFCT DS 22 TRGT SARS-COV-2: CPT | Performed by: PHYSICIAN ASSISTANT

## 2020-10-17 PROCEDURE — 96375 TX/PRO/DX INJ NEW DRUG ADDON: CPT

## 2020-10-17 PROCEDURE — 99284 EMERGENCY DEPT VISIT MOD MDM: CPT

## 2020-10-17 PROCEDURE — 81001 URINALYSIS AUTO W/SCOPE: CPT | Performed by: EMERGENCY MEDICINE

## 2020-10-17 PROCEDURE — 83036 HEMOGLOBIN GLYCOSYLATED A1C: CPT | Performed by: PHYSICIAN ASSISTANT

## 2020-10-17 PROCEDURE — 82962 GLUCOSE BLOOD TEST: CPT

## 2020-10-17 RX ORDER — ALUMINA, MAGNESIA, AND SIMETHICONE 2400; 2400; 240 MG/30ML; MG/30ML; MG/30ML
15 SUSPENSION ORAL EVERY 6 HOURS PRN
Status: DISCONTINUED | OUTPATIENT
Start: 2020-10-17 | End: 2020-10-19 | Stop reason: HOSPADM

## 2020-10-17 RX ORDER — ONDANSETRON 2 MG/ML
4 INJECTION INTRAMUSCULAR; INTRAVENOUS ONCE
Status: COMPLETED | OUTPATIENT
Start: 2020-10-17 | End: 2020-10-17

## 2020-10-17 RX ORDER — DOCUSATE SODIUM 100 MG/1
100 CAPSULE, LIQUID FILLED ORAL EVERY MORNING
Status: DISCONTINUED | OUTPATIENT
Start: 2020-10-18 | End: 2020-10-19 | Stop reason: HOSPADM

## 2020-10-17 RX ORDER — ACETAMINOPHEN 650 MG/1
650 SUPPOSITORY RECTAL EVERY 4 HOURS PRN
Status: DISCONTINUED | OUTPATIENT
Start: 2020-10-17 | End: 2020-10-19 | Stop reason: HOSPADM

## 2020-10-17 RX ORDER — SODIUM CHLORIDE 0.9 % (FLUSH) 0.9 %
10 SYRINGE (ML) INJECTION AS NEEDED
Status: DISCONTINUED | OUTPATIENT
Start: 2020-10-17 | End: 2020-10-19 | Stop reason: HOSPADM

## 2020-10-17 RX ORDER — ACETAMINOPHEN 160 MG/5ML
650 SOLUTION ORAL EVERY 4 HOURS PRN
Status: DISCONTINUED | OUTPATIENT
Start: 2020-10-17 | End: 2020-10-19 | Stop reason: HOSPADM

## 2020-10-17 RX ORDER — MORPHINE SULFATE 4 MG/ML
2 INJECTION, SOLUTION INTRAMUSCULAR; INTRAVENOUS ONCE
Status: COMPLETED | OUTPATIENT
Start: 2020-10-17 | End: 2020-10-17

## 2020-10-17 RX ORDER — CETIRIZINE HYDROCHLORIDE 10 MG/1
10 TABLET ORAL DAILY
Status: DISCONTINUED | OUTPATIENT
Start: 2020-10-18 | End: 2020-10-19 | Stop reason: HOSPADM

## 2020-10-17 RX ORDER — HALOPERIDOL 2 MG/ML
1 SOLUTION ORAL EVERY 6 HOURS PRN
COMMUNITY

## 2020-10-17 RX ORDER — SODIUM CHLORIDE 0.9 % (FLUSH) 0.9 %
10 SYRINGE (ML) INJECTION EVERY 12 HOURS SCHEDULED
Status: DISCONTINUED | OUTPATIENT
Start: 2020-10-17 | End: 2020-10-19 | Stop reason: HOSPADM

## 2020-10-17 RX ORDER — TRAMADOL HYDROCHLORIDE 50 MG/1
50 TABLET ORAL EVERY 6 HOURS PRN
Status: DISCONTINUED | OUTPATIENT
Start: 2020-10-17 | End: 2020-10-19 | Stop reason: HOSPADM

## 2020-10-17 RX ORDER — INSULIN LISPRO 100 [IU]/ML
0-9 INJECTION, SOLUTION INTRAVENOUS; SUBCUTANEOUS
Status: DISCONTINUED | OUTPATIENT
Start: 2020-10-17 | End: 2020-10-19 | Stop reason: HOSPADM

## 2020-10-17 RX ORDER — ACETAMINOPHEN 325 MG/1
650 TABLET ORAL EVERY 4 HOURS PRN
Status: DISCONTINUED | OUTPATIENT
Start: 2020-10-17 | End: 2020-10-19 | Stop reason: HOSPADM

## 2020-10-17 RX ORDER — SACCHAROMYCES BOULARDII 250 MG
250 CAPSULE ORAL DAILY
Status: DISCONTINUED | OUTPATIENT
Start: 2020-10-18 | End: 2020-10-19 | Stop reason: HOSPADM

## 2020-10-17 RX ORDER — PROCHLORPERAZINE MALEATE 10 MG
10 TABLET ORAL EVERY 6 HOURS PRN
COMMUNITY

## 2020-10-17 RX ORDER — PANTOPRAZOLE SODIUM 40 MG/1
40 TABLET, DELAYED RELEASE ORAL EVERY MORNING
Status: DISCONTINUED | OUTPATIENT
Start: 2020-10-18 | End: 2020-10-19 | Stop reason: HOSPADM

## 2020-10-17 RX ORDER — ONDANSETRON 4 MG/1
4 TABLET, FILM COATED ORAL EVERY 6 HOURS PRN
Status: DISCONTINUED | OUTPATIENT
Start: 2020-10-17 | End: 2020-10-19 | Stop reason: HOSPADM

## 2020-10-17 RX ORDER — PROCHLORPERAZINE MALEATE 5 MG/1
10 TABLET ORAL EVERY 6 HOURS PRN
Status: DISCONTINUED | OUTPATIENT
Start: 2020-10-17 | End: 2020-10-19 | Stop reason: HOSPADM

## 2020-10-17 RX ORDER — POLYETHYLENE GLYCOL 3350 17 G/17G
17 POWDER, FOR SOLUTION ORAL DAILY PRN
Status: DISCONTINUED | OUTPATIENT
Start: 2020-10-17 | End: 2020-10-19 | Stop reason: HOSPADM

## 2020-10-17 RX ORDER — DORZOLAMIDE HYDROCHLORIDE AND TIMOLOL MALEATE 20; 5 MG/ML; MG/ML
SOLUTION/ DROPS OPHTHALMIC 2 TIMES DAILY
Status: DISCONTINUED | OUTPATIENT
Start: 2020-10-17 | End: 2020-10-19 | Stop reason: HOSPADM

## 2020-10-17 RX ORDER — LORAZEPAM 0.5 MG/1
0.5 TABLET ORAL EVERY 6 HOURS PRN
COMMUNITY

## 2020-10-17 RX ORDER — ACETAMINOPHEN 325 MG/1
650 TABLET ORAL 2 TIMES DAILY
Status: DISCONTINUED | OUTPATIENT
Start: 2020-10-17 | End: 2020-10-19 | Stop reason: HOSPADM

## 2020-10-17 RX ORDER — NICOTINE POLACRILEX 4 MG
15 LOZENGE BUCCAL
Status: DISCONTINUED | OUTPATIENT
Start: 2020-10-17 | End: 2020-10-19 | Stop reason: HOSPADM

## 2020-10-17 RX ORDER — INSULIN LISPRO 100 [IU]/ML
0-9 INJECTION, SOLUTION INTRAVENOUS; SUBCUTANEOUS AS NEEDED
Status: DISCONTINUED | OUTPATIENT
Start: 2020-10-17 | End: 2020-10-19 | Stop reason: HOSPADM

## 2020-10-17 RX ORDER — NITROGLYCERIN 0.4 MG/1
0.4 TABLET SUBLINGUAL
Status: DISCONTINUED | OUTPATIENT
Start: 2020-10-17 | End: 2020-10-19 | Stop reason: HOSPADM

## 2020-10-17 RX ORDER — DEXTROSE MONOHYDRATE 25 G/50ML
25 INJECTION, SOLUTION INTRAVENOUS
Status: DISCONTINUED | OUTPATIENT
Start: 2020-10-17 | End: 2020-10-19 | Stop reason: HOSPADM

## 2020-10-17 RX ORDER — CHOLECALCIFEROL (VITAMIN D3) 125 MCG
5 CAPSULE ORAL NIGHTLY PRN
Status: DISCONTINUED | OUTPATIENT
Start: 2020-10-17 | End: 2020-10-19 | Stop reason: HOSPADM

## 2020-10-17 RX ORDER — AMLODIPINE BESYLATE 5 MG/1
7.5 TABLET ORAL DAILY
Status: DISCONTINUED | OUTPATIENT
Start: 2020-10-18 | End: 2020-10-19 | Stop reason: HOSPADM

## 2020-10-17 RX ORDER — BISACODYL 10 MG
10 SUPPOSITORY, RECTAL RECTAL DAILY PRN
Status: DISCONTINUED | OUTPATIENT
Start: 2020-10-17 | End: 2020-10-19 | Stop reason: HOSPADM

## 2020-10-17 RX ORDER — LATANOPROST 50 UG/ML
1 SOLUTION/ DROPS OPHTHALMIC NIGHTLY
Status: DISCONTINUED | OUTPATIENT
Start: 2020-10-17 | End: 2020-10-19 | Stop reason: HOSPADM

## 2020-10-17 RX ORDER — LORAZEPAM 0.5 MG/1
0.5 TABLET ORAL EVERY 6 HOURS PRN
Status: DISCONTINUED | OUTPATIENT
Start: 2020-10-17 | End: 2020-10-19 | Stop reason: HOSPADM

## 2020-10-17 RX ORDER — POTASSIUM CHLORIDE 20 MEQ/1
40 TABLET, EXTENDED RELEASE ORAL AS NEEDED
Status: DISCONTINUED | OUTPATIENT
Start: 2020-10-17 | End: 2020-10-19 | Stop reason: HOSPADM

## 2020-10-17 RX ORDER — HALOPERIDOL 2 MG/ML
1 SOLUTION ORAL EVERY 6 HOURS PRN
Status: DISCONTINUED | OUTPATIENT
Start: 2020-10-17 | End: 2020-10-19 | Stop reason: HOSPADM

## 2020-10-17 RX ORDER — ONDANSETRON 2 MG/ML
4 INJECTION INTRAMUSCULAR; INTRAVENOUS EVERY 6 HOURS PRN
Status: DISCONTINUED | OUTPATIENT
Start: 2020-10-17 | End: 2020-10-19 | Stop reason: HOSPADM

## 2020-10-17 RX ORDER — MAGNESIUM SULFATE 1 G/100ML
1 INJECTION INTRAVENOUS AS NEEDED
Status: DISCONTINUED | OUTPATIENT
Start: 2020-10-17 | End: 2020-10-19 | Stop reason: HOSPADM

## 2020-10-17 RX ORDER — MAGNESIUM SULFATE HEPTAHYDRATE 40 MG/ML
2 INJECTION, SOLUTION INTRAVENOUS AS NEEDED
Status: DISCONTINUED | OUTPATIENT
Start: 2020-10-17 | End: 2020-10-19 | Stop reason: HOSPADM

## 2020-10-17 RX ORDER — BRIMONIDINE TARTRATE 2 MG/ML
1 SOLUTION/ DROPS OPHTHALMIC 2 TIMES DAILY
Status: DISCONTINUED | OUTPATIENT
Start: 2020-10-17 | End: 2020-10-19 | Stop reason: HOSPADM

## 2020-10-17 RX ORDER — ACETAMINOPHEN 500 MG
500 TABLET ORAL EVERY 6 HOURS PRN
Status: DISCONTINUED | OUTPATIENT
Start: 2020-10-17 | End: 2020-10-17

## 2020-10-17 RX ORDER — CARBOXYMETHYLCELLULOSE SODIUM 10 MG/ML
1 GEL OPHTHALMIC 4 TIMES DAILY PRN
Status: DISCONTINUED | OUTPATIENT
Start: 2020-10-17 | End: 2020-10-19 | Stop reason: HOSPADM

## 2020-10-17 RX ADMIN — CEFTRIAXONE SODIUM 1 G: 1 INJECTION, POWDER, FOR SOLUTION INTRAMUSCULAR; INTRAVENOUS at 16:05

## 2020-10-17 RX ADMIN — TRAMADOL HYDROCHLORIDE 50 MG: 50 TABLET, FILM COATED ORAL at 23:25

## 2020-10-17 RX ADMIN — TIMOLOL MALEATE: 5 SOLUTION/ DROPS OPHTHALMIC at 21:54

## 2020-10-17 RX ADMIN — Medication 10 ML: at 21:54

## 2020-10-17 RX ADMIN — ONDANSETRON HYDROCHLORIDE 4 MG: 2 SOLUTION INTRAMUSCULAR; INTRAVENOUS at 13:32

## 2020-10-17 RX ADMIN — MORPHINE SULFATE 2 MG: 4 INJECTION INTRAVENOUS at 13:32

## 2020-10-17 RX ADMIN — INSULIN LISPRO 4 UNITS: 100 INJECTION, SOLUTION INTRAVENOUS; SUBCUTANEOUS at 19:59

## 2020-10-17 RX ADMIN — MORPHINE SULFATE 2 MG: 4 INJECTION INTRAVENOUS at 16:05

## 2020-10-17 RX ADMIN — LATANOPROST 1 DROP: 50 SOLUTION/ DROPS OPHTHALMIC at 21:54

## 2020-10-17 RX ADMIN — BRIMONIDINE TARTRATE 1 DROP: 2 SOLUTION/ DROPS OPHTHALMIC at 21:55

## 2020-10-17 RX ADMIN — ACETAMINOPHEN 650 MG: 325 TABLET, FILM COATED ORAL at 20:00

## 2020-10-17 NOTE — ED PROVIDER NOTES
Subjective   Chief complaint unwitnessed fall    History present illness age 89-year-old female who had unwitnessed fall was found on the ground at the assisted living.  The patient does not know how she ended up on the ground.  Not sure she passed out fell she does not remember any other.  She was sent in for evaluation of a pain to her left shoulder and a contusion to her head she has some headache and left arm pain denies any numbness to any no chest or abdominal pain denies any visual change speech difficulty denies any other complaints or any pain is severe worse movement with rest ongoing continuous last several hours          Review of Systems   Constitutional: Negative for chills and fever.   HENT: Negative for congestion and nosebleeds.    Respiratory: Negative for chest tightness and shortness of breath.    Cardiovascular: Negative for chest pain and leg swelling.   Gastrointestinal: Negative for abdominal pain and vomiting.   Genitourinary: Negative for difficulty urinating and dysuria.   Musculoskeletal: Positive for arthralgias. Negative for back pain.   Skin: Negative for color change and pallor.   Neurological: Positive for weakness and headaches. Negative for dizziness.   Psychiatric/Behavioral: Negative for agitation and behavioral problems.       Past Medical History:   Diagnosis Date   • Chronic back pain    • Dementia (CMS/HCC)    • Diabetes mellitus (CMS/HCC)    • Hypertension    • Subarachnoid hemorrhage (CMS/HCC)    • Subdural hematoma (CMS/HCC)    • TBI (traumatic brain injury) (CMS/HCC)    • Vertebral fracture, osteoporotic (CMS/HCC)        Allergies   Allergen Reactions   • Denosumab Itching       History reviewed. No pertinent surgical history.    History reviewed. No pertinent family history.    Social History     Socioeconomic History   • Marital status:      Spouse name: Not on file   • Number of children: Not on file   • Years of education: Not on file   • Highest education  level: Not on file   Tobacco Use   • Smoking status: Never Smoker   • Smokeless tobacco: Never Used   Substance and Sexual Activity   • Alcohol use: Not Currently   • Drug use: Never   • Sexual activity: Not Currently       Prior to Admission medications    Medication Sig Start Date End Date Taking? Authorizing Provider   acetaminophen (TYLENOL) 325 MG tablet Take 650 mg by mouth 2 (Two) Times a Day.    Olivia De La Cruz MD   acetaminophen (TYLENOL) 500 MG tablet Take 500 mg by mouth Every 6 (Six) Hours As Needed for Mild Pain .    Olivia De La Cruz MD   amLODIPine (NORVASC) 5 MG tablet Take 7.5 mg by mouth Daily.    Olivia De La Cruz MD   bimatoprost (LUMIGAN) 0.01 % ophthalmic drops Administer 1 drop to both eyes Every Night.    Olivia De La Cruz MD   brimonidine (ALPHAGAN) 0.2 % ophthalmic solution Administer 1 drop to both eyes 2 (Two) Times a Day.    Olivia De La Cruz MD   carboxymethylcellulose (REFRESH TEARS) 0.5 % solution Administer 1 drop to both eyes 4 (Four) Times a Day. (08:00,12:00,16:00,20:00)    Olivia De La Cruz MD   Cholecalciferol 50 MCG (2000 UT) capsule Take 2,000 Units by mouth Every Morning.    Olivia De La Cruz MD   docusate sodium (COLACE) 100 MG capsule Take 100 mg by mouth Every Morning.    Olivia De La Cruz MD   donepezil (ARICEPT) 10 MG tablet Take 10 mg by mouth Every Night.    Olivia De La Cruz MD   dorzolamide-timolol (COSOPT) 22.3-6.8 MG/ML ophthalmic solution Administer 1 drop to both eyes 2 (Two) Times a Day.    Olivia De La Cruz MD   hydrocortisone (ANUSOL-HC) 2.5 % rectal cream Insert 1 application into the rectum 2 (Two) Times a Day As Needed for Hemorrhoids.    Olivia De La Cruz MD   loratadine (CLARITIN) 10 MG tablet Take 10 mg by mouth Daily.    Olivia De La Cruz MD   metFORMIN ER (GLUCOPHAGE-XR) 500 MG 24 hr tablet Take 500 mg by mouth Every Night.    Olivia De La Cruz MD   omeprazole (priLOSEC) 40 MG capsule Take 40 mg by  mouth Daily.    Olivia De La Cruz MD   polyethylene glycol (MIRALAX) packet Take 17 g by mouth Daily.    Olivia De La Cruz MD   polyethylene glycol (MIRALAX) packet Take 17 g by mouth Daily As Needed (constipation).    Olivia De La Cruz MD   saccharomyces boulardii (FLORASTOR) 250 MG capsule Take 250 mg by mouth Daily.    Olivia De La Cruz MD   simvastatin (ZOCOR) 20 MG tablet Take 20 mg by mouth Every Night.    Olivia De La Cruz MD         Objective   Physical Exam  89-year-old female awake alert no acute distress HEENT extraocular [place] pupils equal round react there is contusion middle forehead but no step-off or deformity no raccoon or michaud sign.  No direct surface lumbar spine tenderness noted trachea midline no JVD no bruits lungs clear no retractions heart regular without murmur abdomen soft no tenderness or pulsatile masses extremities right arm full range of motion no pain pelvis and legs and hips full range of motion no deformity no shortening rotation.  Patient is obvious pain swelling to the left proximal humerus shoulder area with painful limited range of motion no pain with elbow wrist no snuffbox pain she has neurovascular motor or sensory tangling deltoid tricep forearm hand area  strength normal good pulses good cap refill.  She is awake alert she is pleasantly confused but has dementia there is no facial asymmetry normal speech follows commands Alina Coma Scale 15  Procedures           ED Course      Results for orders placed or performed during the hospital encounter of 10/17/20   Comprehensive Metabolic Panel    Specimen: Blood   Result Value Ref Range    Glucose 269 (H) 65 - 99 mg/dL    BUN 11 8 - 23 mg/dL    Creatinine 0.64 0.57 - 1.00 mg/dL    Sodium 138 136 - 145 mmol/L    Potassium 3.7 3.5 - 5.2 mmol/L    Chloride 101 98 - 107 mmol/L    CO2 25.0 22.0 - 29.0 mmol/L    Calcium 9.3 8.2 - 9.6 mg/dL    Total Protein 7.0 6.0 - 8.5 g/dL    Albumin 4.00 3.50 - 5.20 g/dL     ALT (SGPT) 11 1 - 33 U/L    AST (SGOT) 18 1 - 32 U/L    Alkaline Phosphatase 140 (H) 39 - 117 U/L    Total Bilirubin 0.3 0.0 - 1.2 mg/dL    eGFR Non African Amer 86 >60 mL/min/1.73    Globulin 3.0 gm/dL    A/G Ratio 1.3 g/dL    BUN/Creatinine Ratio 17.2 7.0 - 25.0    Anion Gap 12.0 5.0 - 15.0 mmol/L   Troponin    Specimen: Blood   Result Value Ref Range    Troponin T <0.010 0.000 - 0.030 ng/mL   CK    Specimen: Blood   Result Value Ref Range    Creatine Kinase 79 20 - 180 U/L   Urinalysis With Culture If Indicated - Urine, Catheter In/Out    Specimen: Urine, Catheter In/Out   Result Value Ref Range    Color, UA Yellow Yellow, Straw    Appearance, UA Clear Clear    pH, UA 7.0 5.0 - 8.0    Specific Gravity, UA 1.016 1.005 - 1.030    Glucose, UA >=1000 mg/dL (3+) (A) Negative    Ketones, UA Negative Negative    Bilirubin, UA Negative Negative    Blood, UA Negative Negative    Protein, UA Negative Negative    Leuk Esterase, UA Negative Negative    Nitrite, UA Positive (A) Negative    Urobilinogen, UA 1.0 E.U./dL 0.2 - 1.0 E.U./dL   CBC Auto Differential    Specimen: Blood   Result Value Ref Range    WBC 5.30 3.40 - 10.80 10*3/mm3    RBC 4.22 3.77 - 5.28 10*6/mm3    Hemoglobin 12.7 12.0 - 15.9 g/dL    Hematocrit 37.9 34.0 - 46.6 %    MCV 89.7 79.0 - 97.0 fL    MCH 30.0 26.6 - 33.0 pg    MCHC 33.5 31.5 - 35.7 g/dL    RDW 14.7 12.3 - 15.4 %    RDW-SD 45.5 37.0 - 54.0 fl    MPV 8.3 6.0 - 12.0 fL    Platelets 175 140 - 450 10*3/mm3    Neutrophil % 75.7 42.7 - 76.0 %    Lymphocyte % 16.6 (L) 19.6 - 45.3 %    Monocyte % 5.9 5.0 - 12.0 %    Eosinophil % 1.2 0.3 - 6.2 %    Basophil % 0.6 0.0 - 1.5 %    Neutrophils, Absolute 4.00 1.70 - 7.00 10*3/mm3    Lymphocytes, Absolute 0.90 0.70 - 3.10 10*3/mm3    Monocytes, Absolute 0.30 0.10 - 0.90 10*3/mm3    Eosinophils, Absolute 0.10 0.00 - 0.40 10*3/mm3    Basophils, Absolute 0.00 0.00 - 0.20 10*3/mm3    nRBC 0.0 0.0 - 0.2 /100 WBC   Urinalysis, Microscopic Only - Urine, Catheter  In/Out    Specimen: Urine, Catheter In/Out   Result Value Ref Range    RBC, UA 21-30 (A) None Seen /HPF    WBC, UA 21-30 (A) None Seen /HPF    Bacteria, UA 3+ (A) None Seen /HPF    Squamous Epithelial Cells, UA 0-2 None Seen, 0-2 /HPF    Hyaline Casts, UA 0-2 None Seen /LPF    Methodology Automated Microscopy      Xr Shoulder 2+ View Left    Result Date: 10/17/2020   1. Suspicion of fracture of indeterminate age of the proximal metaphysis-head of the left humerus that is difficult to visualize because of the severe osteoporosis. CT left shoulder joint would be helpful to further evaluate this abnormality 2. Fracture of indeterminate age inferior aspect left scapula wing. 3. Fractures of indeterminate age in the left ribs. 4. No acute or focal arising left lung.  Electronically Signed By-DR. Kal Feldman MD On:10/17/2020 1:47 PM This report was finalized on [id number] by DR. Kal Feldman MD.    Xr Humerus Left    Result Date: 10/17/2020   1. Moderately displaced angulated fracture of indeterminate age proximal metaphysis-left head left humerus CT of the left shoulder joint would be helpful to further evaluate this abnormality.  Electronically Signed By-DR. Kal Feldman MD On:10/17/2020 1:52 PM This report was finalized on [id number] by DR. Kal Feldman MD.    Ct Head Without Contrast    Result Date: 10/17/2020   1. No acute intracranial abnormality seen. 2. No evidence of fracture the skull or facial bones.  Electronically Signed By-DR. Kal Feldman MD On:10/17/2020 2:55 PM This report was finalized on [id number] by DR. Kal Feldman MD.    Ct Cervical Spine Without Contrast    Result Date: 10/17/2020   1. No evidence of fracture or dislocation cervical spine. 2. There are several anterior wedge compression fractures at multiple levels in the superior thoracic spine there are probably old and less likely acute. Clinical correlation physical examination would  BE helpful  Electronically Signed By-DR. Kal Feldman MD On:10/17/2020 2:59 PM This report was finalized on [id number] by DR. Kal Feldman MD.    Ct Upper Extremity Left Without Contrast    Result Date: 10/17/2020   1. Moderately-severely displaced angulated acute fracture the proximal metaphysis-head of the left humerus without evidence of dislocation of the left glenohumeral shoulder joint. 2. Probably old and less likely acute fracture of the posterior inferior aspect of the left scapular wing. 3. No evidence of acute fracture of the left ribs.  Electronically Signed By-DR. Kal Feldman MD On:10/17/2020 3:05 PM This report was finalized on [id number] by DR. Kal Feldman MD.    Medications   sodium chloride 0.9 % flush 10 mL (has no administration in time range)   Morphine sulfate (PF) injection 2 mg (2 mg Intravenous Given 10/17/20 1332)   ondansetron (ZOFRAN) injection 4 mg (4 mg Intravenous Given 10/17/20 1332)   cefTRIAXone (ROCEPHIN) in SWFI 1 gram/10ml IV PUSH syringe (1 g Intravenous Given 10/17/20 1605)   Morphine sulfate (PF) injection 2 mg (2 mg Intravenous Given 10/17/20 1605)                                            MDM  Number of Diagnoses or Management Options  Contusion of scalp, initial encounter:   Fall, initial encounter:   Other closed displaced fracture of proximal end of left humerus, initial encounter:   Syncope, unspecified syncope type:   Diagnosis management comments: Medical decision making.  Patient IV established placed on monitors given morphine 2 IV and Zofran 4 IV and had the above exam evaluation.  Patient CBC and electrolytes are unremarkable and a blood sugar 269 CK and troponin within normal limits.  The urine reveals positive nitrate 30 white cells 30 red cells 3+ bacteria on a cath sample.  That was cultured CT the head without acute findings cervical spine without acute fracture shoulder reveals a moderately severe displaced angulated  acute fracture of the proximal metastasis of the left humerus without evidence of dislocation.  Patient require additional 2 mg morphine IV she was given Rocephin 1 g IV and placed in the sling.  The patient had some event where she ended up on the ground it is hard to tell if she passed out or she just tripped and fell it was an unwitnessed nobody knows how long she has been laying there including the patient and she does not remember the events.  She has a urinary tract infection she got a bad proximal humerus fracture.  Hospitalist physician assistant notified the patient be placed in the hospital for further pain management and IV antibiotic therapy and monitoring.  Stable otherwise unremarkable ER course      Final diagnoses:   Fall, initial encounter   Syncope, unspecified syncope type   Contusion of scalp, initial encounter   Other closed displaced fracture of proximal end of left humerus, initial encounter            Jarett García MD  10/17/20 1542       Jarett García MD  10/17/20 2337

## 2020-10-17 NOTE — H&P
[facility]      Patient Name: Carlo Garcia  : 1923  MRN: [id number]  Primary Care Physician: Yamilka Jang APRN  Date of admission: 10/17/2020    Patient Care Team:  Yamilka Jang APRN as PCP - General (Nurse Practitioner)  Yamilka Jang APRN as PCP - Claims Attributed          Subjective   History Present Illness     Chief Complaint:   Chief Complaint   Patient presents with   • Fall         Ms. Garcia is a 89 y.o. female presents to [facility] ER 10/17/2020 complaining of a fall versus possible syncopal episode earlier today.  Patient had an unwitnessed fall but she fell to the ground at the assisted living facility.  It is not clear how the patient had ended up on the ground.  Patient does not remember if he passed out and fell or if she had hit her head.  In the ED, patient had complained of some generalized weakness and headache but otherwise denied any acute complaints.  Upon evaluation, patient was alert and oriented 1-2 which is baseline for the patient.  This was confirmed with family.  Patient is cooperative and able to follow commands but is otherwise a poor historian.  After talking with the patient's daughter, Elodia Blue, it is understood that the patient is a DNR.    In the ED, CK normal, initial troponin normal, alk phos elevated at 140, glucose of 269, otherwise unremarkable CMP.  CBC largely unremarkable.  UA shows positive nitrites, 3+ glucose, 3+ bacteria.  Urine culture pending.  X-ray of left humerus shows moderately displaced angulated fracture of indeterminate age proximal metaphysis-left head left humerus CT of the left shoulder joint would be helpful to further evaluate this abnormality.  Left shoulder x-ray shows Suspicion of fracture of indeterminate age of the proximal metaphysis-head of the left humerus that is difficult to visualize because of the severe osteoporosis. CT left shoulder joint would be helpful to  further evaluate this abnormality. Fracture of indeterminate age inferior aspect of the left scapular wing.  Fractures of indeterminate age of the left ribs.  No acute or focal arising left lung.  CT head shows no acute intracranial abnormality seen.  No evidence of fracture of the skull or facial bones.  CT of cervical spine shows no evidence of fracture or dislocation of cervical spine.  There are several anterior wedge compression fractures at multiple levels in the superior thoracic spine and there are probably old and less likely acute.  Clinical correlation, physical examination would be helpful.  CT upper extremity shows moderately-severely displaced angulated acute fracture of proximal metaphysis-head of the left humerus without evidence of dislocation of the left glenohumeral shoulder joint.  Probably old and less likely acute fracture of the posterior inferior aspect of the left scapular wing.  No evidence of acute fracture of the left ribs.  Patient is afebrile, pulse in the 80s, on room air oxygen and 99% SPO2 and blood pressure 130s over 90s.  Patient given morphine 2 mg x 2, Rocephin, Zofran, orthopedic consult.  EKG pending.     Review of Systems   Unable to perform ROS: dementia           Personal History     Past Medical History:   Past Medical History:   Diagnosis Date   • Chronic back pain    • Dementia (CMS/HCC)    • Diabetes mellitus (CMS/HCC)    • Hypertension    • Subarachnoid hemorrhage (CMS/HCC)    • Subdural hematoma (CMS/HCC)    • TBI (traumatic brain injury) (CMS/HCC)    • Vertebral fracture, osteoporotic (CMS/HCC)        Surgical History:    History reviewed. No pertinent surgical history.        Family History: family history includes Heart disease in her sister. Otherwise pertinent FHx was reviewed and unremarkable.     Social History:  reports that she has never smoked. She has never used smokeless tobacco. She reports previous alcohol use. She reports that she does not use  drugs.      Medications:  Prior to Admission medications    Medication Sig Start Date End Date Taking? Authorizing Provider   haloperidol (HALDOL) 2 MG/ML solution Take 1 mg by mouth Every 6 (Six) Hours As Needed for Agitation.   Yes Olivia De La Cruz MD   hyoscyamine (LEVSIN) 0.125 MG SL tablet Take 0.125 mg by mouth Every 4 (Four) Hours As Needed for Cramping.   Yes Olivia De La Cruz MD   LORazepam (ATIVAN) 0.5 MG tablet Take 0.5 mg by mouth Every 6 (Six) Hours As Needed for Anxiety.   Yes Olivia De La Cruz MD   prochlorperazine (COMPAZINE) 10 MG tablet Take 10 mg by mouth Every 6 (Six) Hours As Needed for Nausea or Vomiting.   Yes Olivia De La Cruz MD   acetaminophen (TYLENOL) 325 MG tablet Take 650 mg by mouth 2 (Two) Times a Day.    Olivia De La Cruz MD   acetaminophen (TYLENOL) 500 MG tablet Take 500 mg by mouth Every 6 (Six) Hours As Needed for Mild Pain .    Olivia De La Cruz MD   amLODIPine (NORVASC) 5 MG tablet Take 7.5 mg by mouth Daily.    Olivia De La Cruz MD   bimatoprost (LUMIGAN) 0.01 % ophthalmic drops Administer 1 drop to both eyes Every Night.    Olivia De La Cruz MD   brimonidine (ALPHAGAN) 0.2 % ophthalmic solution Administer 1 drop to both eyes 2 (Two) Times a Day.    Olivia De La Cruz MD   carboxymethylcellulose (REFRESH TEARS) 0.5 % solution Administer 1 drop to both eyes 4 (Four) Times a Day. (08:00,12:00,16:00,20:00)    Olivia De La Cruz MD   Cholecalciferol 50 MCG (2000 UT) capsule Take 2,000 Units by mouth Every Morning.    Olivia De La Cruz MD   docusate sodium (COLACE) 100 MG capsule Take 100 mg by mouth Every Morning.    Olivia De La Cruz MD   dorzolamide-timolol (COSOPT) 22.3-6.8 MG/ML ophthalmic solution Administer 1 drop to both eyes 2 (Two) Times a Day.    Olivia De La Cruz MD   hydrocortisone (ANUSOL-HC) 2.5 % rectal cream Insert 1 application into the rectum 2 (Two) Times a Day As Needed for Hemorrhoids.    Olivia De La Cruz  MD   loratadine (CLARITIN) 10 MG tablet Take 10 mg by mouth Daily.    Olivia De La Cruz MD   metFORMIN ER (GLUCOPHAGE-XR) 500 MG 24 hr tablet Take 500 mg by mouth Every Night.    Olivia De La Cruz MD   omeprazole (priLOSEC) 40 MG capsule Take 40 mg by mouth Daily.    Olivia De La Cruz MD   polyethylene glycol (MIRALAX) packet Take 17 g by mouth Daily As Needed (constipation).    Olivia De La Cruz MD   saccharomyces boulardii (FLORASTOR) 250 MG capsule Take 250 mg by mouth Daily.    Olivia De La Cruz MD   donepezil (ARICEPT) 10 MG tablet Take 10 mg by mouth Every Night.  10/17/20  Olivia De La Cruz MD   polyethylene glycol (MIRALAX) packet Take 17 g by mouth Daily.  10/17/20  Olivia De La Cruz MD   simvastatin (ZOCOR) 20 MG tablet Take 20 mg by mouth Every Night.  10/17/20  Olivia De La Cruz MD       Allergies:    Allergies   Allergen Reactions   • Denosumab Itching       Objective   Objective     Vital Signs  Temp:  [97.7 °F (36.5 °C)-98.3 °F (36.8 °C)] 98.3 °F (36.8 °C)  Heart Rate:  [84-93] 92  Resp:  [14-16] 14  BP: (118-165)/(74-95) 143/75  SpO2:  [90 %-100 %] 100 %  on   ;   Device (Oxygen Therapy): room air  Body mass index is 45.64 kg/m².    Physical Exam  Vitals signs and nursing note reviewed.   Constitutional:       General: She is not in acute distress.     Appearance: She is well-developed. She is not diaphoretic.   HENT:      Head: Normocephalic and atraumatic.      Nose: Nose normal.      Mouth/Throat:      Pharynx: No oropharyngeal exudate.   Eyes:      Extraocular Movements: Extraocular movements intact.      Conjunctiva/sclera: Conjunctivae normal.      Pupils: Pupils are equal, round, and reactive to light.   Neck:      Musculoskeletal: Normal range of motion.   Cardiovascular:      Rate and Rhythm: Normal rate and regular rhythm.      Heart sounds: Normal heart sounds.      Comments: S1, S2 audible.  Pulmonary:      Effort: Pulmonary effort is normal. No respiratory  distress.      Breath sounds: Normal breath sounds. No wheezing or rales.      Comments: On room air.  Abdominal:      General: Bowel sounds are normal. There is no distension.      Palpations: Abdomen is soft.      Tenderness: There is no abdominal tenderness. There is no guarding or rebound.      Hernia: No hernia is present.   Musculoskeletal:         General: No tenderness or deformity.      Comments: Patient's left arm in sling currently.  Patient complains of pain in her left shoulder upon movement.   Skin:     General: Skin is warm.      Findings: No erythema or rash.   Neurological:      Mental Status: She is alert and oriented to person, place, and time.      Cranial Nerves: No cranial nerve deficit.   Psychiatric:         Mood and Affect: Mood normal.         Behavior: Behavior normal.         Results Review:  I have personally reviewed most recent cardiac tracings, lab results and radiology images and interpretations and agree with findings.    Results from last 7 days   Lab Units 10/17/20  1325   WBC 10*3/mm3 5.30   HEMOGLOBIN g/dL 12.7   HEMATOCRIT % 37.9   PLATELETS 10*3/mm3 175     Results from last 7 days   Lab Units 10/17/20  1924 10/17/20  1325   SODIUM mmol/L  --  138   POTASSIUM mmol/L  --  3.7   CHLORIDE mmol/L  --  101   CO2 mmol/L  --  25.0   BUN mg/dL  --  11   CREATININE mg/dL  --  0.64   GLUCOSE mg/dL  --  269*   CALCIUM mg/dL  --  9.3   ALT (SGPT) U/L  --  11   AST (SGOT) U/L  --  18   TROPONIN T ng/mL <0.010 <0.010     Estimated Creatinine Clearance: 44.2 mL/min (by C-G formula based on SCr of 0.64 mg/dL).  Brief Urine Lab Results  (Last result in the past 365 days)      Color   Clarity   Blood   Leuk Est   Nitrite   Protein   CREAT   Urine HCG        10/17/20 1350 Yellow Clear Negative Negative Positive Negative               Microbiology Results (last 10 days)     Procedure Component Value - Date/Time    Respiratory Panel PCR w/COVID-19(SARS-CoV-2) [facility]ST[facility]I/[facility] In-House, NP  Swab in UTM/VTM, 3-4 HR TAT - Swab, Nasopharynx [[id number]]  (Normal) Collected: 10/17/20 1929    Lab Status: Final result Specimen: Swab from Nasopharynx Updated: 10/17/20 2039     ADENOVIRUS, PCR Not Detected     Coronavirus 229E Not Detected     Coronavirus HKU1 Not Detected     Coronavirus NL63 Not Detected     Coronavirus OC43 Not Detected     COVID19 Not Detected     Human Metapneumovirus Not Detected     Human Rhinovirus/Enterovirus Not Detected     Influenza A PCR Not Detected     Influenza A H1 Not Detected     Influenza A H1 2009 PCR Not Detected     Influenza A H3 Not Detected     Influenza B PCR Not Detected     Parainfluenza Virus 1 Not Detected     Parainfluenza Virus 2 Not Detected     Parainfluenza Virus 3 Not Detected     Parainfluenza Virus 4 Not Detected     RSV, PCR Not Detected     Bordetella pertussis pcr Not Detected     Bordetella parapertussis PCR Not Detected     Chlamydophila pneumoniae PCR Not Detected     Mycoplasma pneumo by PCR Not Detected    Narrative:      Fact sheet for providers: https://docs.[employer]/wp-content/uploads/NQH2460-7080-HE5.1-EUA-Provider-Fact-Sheet-3.pdf    Fact sheet for patients: https://docs.[employer]/wp-content/uploads/BXX2796-5307-CJ3.1-EUA-Patient-Fact-Sheet-1.pdf          ECG/EMG Results (most recent)     None                    Xr Shoulder 2+ View Left    Result Date: 10/17/2020   1. Suspicion of fracture of indeterminate age of the proximal metaphysis-head of the left humerus that is difficult to visualize because of the severe osteoporosis. CT left shoulder joint would be helpful to further evaluate this abnormality 2. Fracture of indeterminate age inferior aspect left scapula wing. 3. Fractures of indeterminate age in the left ribs. 4. No acute or focal arising left lung.  Electronically Signed By-DR. Kal Feldman MD On:10/17/2020 1:47 PM This report was finalized on [id number] by DR. Kal Feldman MD.    Xr Humerus  Left    Result Date: 10/17/2020   1. Moderately displaced angulated fracture of indeterminate age proximal metaphysis-left head left humerus CT of the left shoulder joint would be helpful to further evaluate this abnormality.  Electronically Signed By-DR. Kal Feldman MD On:10/17/2020 1:52 PM This report was finalized on [id number] by DR. Kal Feldman MD.    Ct Head Without Contrast    Result Date: 10/17/2020   1. No acute intracranial abnormality seen. 2. No evidence of fracture the skull or facial bones.  Electronically Signed By-DR. Kal Feldman MD On:10/17/2020 2:55 PM This report was finalized on [id number] by DR. Kal Feldman MD.    Ct Cervical Spine Without Contrast    Result Date: 10/17/2020   1. No evidence of fracture or dislocation cervical spine. 2. There are several anterior wedge compression fractures at multiple levels in the superior thoracic spine there are probably old and less likely acute. Clinical correlation physical examination would BE helpful  Electronically Signed By-DR. Kal Feldman MD On:10/17/2020 2:59 PM This report was finalized on [id number] by DR. Kal Feldman MD.    Xr Chest 1 View    Result Date: 10/17/2020   1. Acute fracture of the proximal metaphysis-head left humerus. 2. Old appearing fractures left ribs. 3. A descending thoracic aorta and arch the thoracic aorta are tortuous and dilated. This causes the trachea to be deviated to the right 4. Chronic lung disease. 5. Cardiomegaly  Electronically Signed By-DR. Kal Feldman MD On:10/17/2020 8:31 PM This report was finalized on [id number] by DR. Kal Feldman MD.    Ct Upper Extremity Left Without Contrast    Result Date: 10/17/2020   1. Moderately-severely displaced angulated acute fracture the proximal metaphysis-head of the left humerus without evidence of dislocation of the left glenohumeral shoulder joint. 2. Probably old and less likely acute  fracture of the posterior inferior aspect of the left scapular wing. 3. No evidence of acute fracture of the left ribs.  Electronically Signed By-DR. Kal Feldman MD On:10/17/2020 3:05 PM This report was finalized on [id number] by DR. Kal Feldman MD.        Estimated Creatinine Clearance: 44.2 mL/min (by C-G formula based on SCr of 0.64 mg/dL).    Assessment/Plan   Assessment/Plan       Active Hospital Problems    Diagnosis  POA   • **Fall [W19.XXXA]  Yes   • Morbid obesity (CMS/HCC) [E66.01]  Yes   • GERD (gastroesophageal reflux disease) [K21.9]  Yes   • Chronic constipation [K59.09]  Yes   • Anxiety [F41.9]  Yes   • Hyperglycemia [R73.9]  Yes   • Left humeral fracture [S42.302A]  Yes   • UTI (urinary tract infection) [N39.0]  Yes   • Dementia (CMS/HCC) [F03.90]  Yes   • Essential hypertension [I10]  Yes   • Subdural hematoma (CMS/HCC) [S06.5X9A]  Yes   • Subarachnoid hemorrhage (CMS/HCC) [I60.9]  Yes   • Personal history of traumatic brain injury [Z87.820]  Not Applicable   • Diabetes mellitus, type II (CMS/HCC) [E11.9]  Yes      Resolved Hospital Problems   No resolved problems to display.       Unwitnessed fall   -Unwitnessed fall earlier today at assisted living facility, unclear if patient had a syncopal episode versus underlying infection versus mechanical fall as patient is poor historian with underlying dementia but is seemingly at baseline mentation after discussion with family  -CK normal,   -initial troponin normal, alk phos elevated at 140,   - otherwise unremarkable CMP.    -CBC largely unremarkable.    -UA shows positive nitrites, 3+ glucose, 3+ bacteria.  Urine culture pending.     -CT head shows no acute intracranial abnormality seen.  No evidence of fracture of the skull or facial bones.    -CT of cervical spine shows no evidence of fracture or dislocation of cervical spine.  There are several anterior wedge compression fractures at multiple levels in the superior thoracic  spine and there are probably old and less likely acute.  Clinical correlation, physical examination would be helpful.    -Patient is afebrile, pulse in the 80s, on room air oxygen and 99% SPO2 and blood pressure 130s over 90s.    -Patient given morphine 2 mg x 2, Rocephin, Zofran, orthopedic consult.  EKG pending.   -Check chest x-ray, TSH, echo, carotid Dopplers, serial troponins, COVID-19 swab  -Fall precautions, cardiac monitoring  -PT/OT consult  -Diabetic diet, advance as tolerated    UTI  -Continue Rocephin    Left humeral fracture  -X-ray of left humerus shows moderately displaced angulated fracture of indeterminate age proximal metaphysis-left head left humerus CT of the left shoulder joint would be helpful to further evaluate this abnormality.    -Left shoulder x-ray shows suspicion of fracture of indeterminate age of the proximal metaphysis-head of the left humerus that is difficult to visualize because of the severe osteoporosis. CT left shoulder joint would be helpful to further evaluate this abnormality.  -CT upper extremity shows moderately-severely displaced angulated acute fracture of proximal metaphysis-head of the left humerus without evidence of dislocation of the left glenohumeral shoulder joint.  Probably old and less likely acute fracture of the posterior inferior aspect of the left scapular wing.  No evidence of acute fracture of the left ribs.    -Orthopedic surgery consult  -Pain control tramadol  -Diabetic diet until n.p.o. midnight    Diabetes mellitus type 2 with hyperglycemia  -glucose of 269,  -SSI, Accu-Cheks 3 times daily before meals  -Check A1c  -Hold home Metformin    Dementia  -Seemingly at baseline mentation after discussion with family  -Continue home Haldol as needed for agitation    History of subdural hematoma, subarachnoid hemorrhage, personal history of traumatic brain injury    Anxiety  -Continue home Ativan as needed, inspect verified    Essential hypertension, chronic,  moderately controlled  -Continue home Norvasc    Chronic constipation  -Continue home MiraLAX, Colace    GERD  -Continue home PPI    Morbid obesity, BMI 45.64  -Encourage lifestyle modifications      VTE Prophylaxis - SCDs  Mechanical Order History:     None      Pharmalogical Order History:     None          CODE STATUS:    Code Status and Medical Interventions:   Ordered at: 10/17/20 1820     Limited Support to NOT Include:    Other     Level Of Support Discussed With:    Health Care Surrogate     Code Status:    No CPR     Medical Interventions (Level of Support Prior to Arrest):    Limited     Limited Support to Also NOT Include:    no cpr       This patient has been examined wearing appropriate Personal Protective Equipment and discussed with hospital infection control department. 10/17/20      I discussed the patient's findings and my recommendations with patient.        Electronically signed by GRICELDA Victoria, 10/17/20, 4:32 PM EDT.  [facility]ist Team

## 2020-10-18 ENCOUNTER — APPOINTMENT (OUTPATIENT)
Dept: CARDIOLOGY | Facility: HOSPITAL | Age: 85
End: 2020-10-18

## 2020-10-18 LAB
ABO GROUP BLD: NORMAL
ANION GAP SERPL CALCULATED.3IONS-SCNC: 11 MMOL/L (ref 5–15)
BASOPHILS # BLD AUTO: 0 10*3/MM3 (ref 0–0.2)
BASOPHILS NFR BLD AUTO: 0.6 % (ref 0–1.5)
BH CV ECHO MEAS - ACS: 1.9 CM
BH CV ECHO MEAS - AI DEC SLOPE: 228.5 CM/SEC^2
BH CV ECHO MEAS - AI DEC TIME: 1.8 SEC
BH CV ECHO MEAS - AI MAX PG: 64.2 MMHG
BH CV ECHO MEAS - AI MAX VEL: 400.5 CM/SEC
BH CV ECHO MEAS - AI P1/2T: 513.3 MSEC
BH CV ECHO MEAS - AO MAX PG (FULL): 1.8 MMHG
BH CV ECHO MEAS - AO MAX PG: 9.7 MMHG
BH CV ECHO MEAS - AO MEAN PG (FULL): 3.4 MMHG
BH CV ECHO MEAS - AO MEAN PG: 6.6 MMHG
BH CV ECHO MEAS - AO ROOT AREA (BSA CORRECTED): 1.7
BH CV ECHO MEAS - AO ROOT AREA: 8.8 CM^2
BH CV ECHO MEAS - AO ROOT DIAM: 3.3 CM
BH CV ECHO MEAS - AO V2 MAX: 156 CM/SEC
BH CV ECHO MEAS - AO V2 MEAN: 122.9 CM/SEC
BH CV ECHO MEAS - AO V2 VTI: 31.3 CM
BH CV ECHO MEAS - AORTIC HR: 92.2 BPM
BH CV ECHO MEAS - AORTIC R-R: 0.65 SEC
BH CV ECHO MEAS - ASC AORTA: 3 CM
BH CV ECHO MEAS - AVA(I,A): 1.8 CM^2
BH CV ECHO MEAS - AVA(I,D): 1.8 CM^2
BH CV ECHO MEAS - AVA(V,A): 2.3 CM^2
BH CV ECHO MEAS - AVA(V,D): 2.3 CM^2
BH CV ECHO MEAS - BSA(HAYCOCK): 2.1 M^2
BH CV ECHO MEAS - BSA: 1.9 M^2
BH CV ECHO MEAS - BZI_BMI: 42.4 KILOGRAMS/M^2
BH CV ECHO MEAS - BZI_METRIC_HEIGHT: 152.4 CM
BH CV ECHO MEAS - BZI_METRIC_WEIGHT: 98.4 KG
BH CV ECHO MEAS - CI(AO): 13.1 L/MIN/M^2
BH CV ECHO MEAS - CI(LVOT): 2.7 L/MIN/M^2
BH CV ECHO MEAS - CO(AO): 25.3 L/MIN
BH CV ECHO MEAS - CO(LVOT): 5.3 L/MIN
BH CV ECHO MEAS - EDV(CUBED): 47.2 ML
BH CV ECHO MEAS - EDV(MOD-SP4): 42.7 ML
BH CV ECHO MEAS - EDV(TEICH): 54.9 ML
BH CV ECHO MEAS - EF(CUBED): 68.5 %
BH CV ECHO MEAS - EF(MOD-BP): 61 %
BH CV ECHO MEAS - EF(MOD-SP4): 60.5 %
BH CV ECHO MEAS - EF(TEICH): 61 %
BH CV ECHO MEAS - ESV(CUBED): 14.9 ML
BH CV ECHO MEAS - ESV(MOD-SP4): 16.9 ML
BH CV ECHO MEAS - ESV(TEICH): 21.4 ML
BH CV ECHO MEAS - FS: 32 %
BH CV ECHO MEAS - IVS/LVPW: 1.3
BH CV ECHO MEAS - IVSD: 1.2 CM
BH CV ECHO MEAS - LA DIMENSION(2D): 3.3 CM
BH CV ECHO MEAS - LV DIASTOLIC VOL/BSA (35-75): 22.1 ML/M^2
BH CV ECHO MEAS - LV MASS(C)D: 124 GRAMS
BH CV ECHO MEAS - LV MASS(C)DI: 64.1 GRAMS/M^2
BH CV ECHO MEAS - LV MAX PG: 7.9 MMHG
BH CV ECHO MEAS - LV MEAN PG: 3.2 MMHG
BH CV ECHO MEAS - LV SYSTOLIC VOL/BSA (12-30): 8.7 ML/M^2
BH CV ECHO MEAS - LV V1 MAX: 140.5 CM/SEC
BH CV ECHO MEAS - LV V1 MEAN: 79.1 CM/SEC
BH CV ECHO MEAS - LV V1 VTI: 22.6 CM
BH CV ECHO MEAS - LVIDD: 3.6 CM
BH CV ECHO MEAS - LVIDS: 2.5 CM
BH CV ECHO MEAS - LVOT AREA: 2.5 CM^2
BH CV ECHO MEAS - LVOT DIAM: 1.8 CM
BH CV ECHO MEAS - LVPWD: 0.97 CM
BH CV ECHO MEAS - MV A MAX VEL: 141.9 CM/SEC
BH CV ECHO MEAS - MV DEC SLOPE: 702.2 CM/SEC^2
BH CV ECHO MEAS - MV DEC TIME: 0.12 SEC
BH CV ECHO MEAS - MV E MAX VEL: 86 CM/SEC
BH CV ECHO MEAS - MV E/A: 0.61
BH CV ECHO MEAS - MV MAX PG: 13.2 MMHG
BH CV ECHO MEAS - MV MEAN PG: 5.8 MMHG
BH CV ECHO MEAS - MV V2 MAX: 181.4 CM/SEC
BH CV ECHO MEAS - MV V2 MEAN: 112.9 CM/SEC
BH CV ECHO MEAS - MV V2 VTI: 32.2 CM
BH CV ECHO MEAS - MVA(VTI): 1.8 CM^2
BH CV ECHO MEAS - PA MAX PG (FULL): 0.22 MMHG
BH CV ECHO MEAS - PA MAX PG: 2.7 MMHG
BH CV ECHO MEAS - PA MEAN PG (FULL): 0.06 MMHG
BH CV ECHO MEAS - PA MEAN PG: 1.1 MMHG
BH CV ECHO MEAS - PA V2 MAX: 81.8 CM/SEC
BH CV ECHO MEAS - PA V2 MEAN: 48.4 CM/SEC
BH CV ECHO MEAS - PA V2 VTI: 11.4 CM
BH CV ECHO MEAS - PVA(I,A): 4.1 CM^2
BH CV ECHO MEAS - PVA(I,D): 4.1 CM^2
BH CV ECHO MEAS - PVA(V,A): 3.9 CM^2
BH CV ECHO MEAS - PVA(V,D): 3.9 CM^2
BH CV ECHO MEAS - QP/QS: 0.82
BH CV ECHO MEAS - RAP SYSTOLE: 3 MMHG
BH CV ECHO MEAS - RV MAX PG: 2.5 MMHG
BH CV ECHO MEAS - RV MEAN PG: 1.1 MMHG
BH CV ECHO MEAS - RV V1 MAX: 78.4 CM/SEC
BH CV ECHO MEAS - RV V1 MEAN: 45.1 CM/SEC
BH CV ECHO MEAS - RV V1 VTI: 11.7 CM
BH CV ECHO MEAS - RVDD: 2.3 CM
BH CV ECHO MEAS - RVOT AREA: 4 CM^2
BH CV ECHO MEAS - RVOT DIAM: 2.3 CM
BH CV ECHO MEAS - RVSP: 36.7 MMHG
BH CV ECHO MEAS - SI(AO): 142.3 ML/M^2
BH CV ECHO MEAS - SI(CUBED): 16.7 ML/M^2
BH CV ECHO MEAS - SI(LVOT): 29.7 ML/M^2
BH CV ECHO MEAS - SI(MOD-SP4): 13.4 ML/M^2
BH CV ECHO MEAS - SI(TEICH): 17.3 ML/M^2
BH CV ECHO MEAS - SV(AO): 275 ML
BH CV ECHO MEAS - SV(CUBED): 32.3 ML
BH CV ECHO MEAS - SV(LVOT): 57.4 ML
BH CV ECHO MEAS - SV(MOD-SP4): 25.8 ML
BH CV ECHO MEAS - SV(RVOT): 47 ML
BH CV ECHO MEAS - SV(TEICH): 33.5 ML
BH CV ECHO MEAS - TR MAX VEL: 290.4 CM/SEC
BH CV XLRA MEAS LEFT CCA RATIO VEL: 85.5 CM/SEC
BH CV XLRA MEAS LEFT DIST CCA EDV: 9.8 CM/SEC
BH CV XLRA MEAS LEFT DIST CCA PSV: 65.4 CM/SEC
BH CV XLRA MEAS LEFT DIST ICA EDV: -20.1 CM/SEC
BH CV XLRA MEAS LEFT DIST ICA PSV: -77.2 CM/SEC
BH CV XLRA MEAS LEFT ICA RATIO VEL: -78.6 CM/SEC
BH CV XLRA MEAS LEFT ICA/CCA RATIO: -0.92
BH CV XLRA MEAS LEFT PROX CCA EDV: 10.3 CM/SEC
BH CV XLRA MEAS LEFT PROX CCA PSV: 85.5 CM/SEC
BH CV XLRA MEAS LEFT PROX ECA PSV: -89 CM/SEC
BH CV XLRA MEAS LEFT PROX ICA EDV: -16.7 CM/SEC
BH CV XLRA MEAS LEFT PROX ICA PSV: -78.6 CM/SEC
BH CV XLRA MEAS LEFT PROX SCLA PSV: 82.6 CM/SEC
BH CV XLRA MEAS LEFT VERTEBRAL A PSV: -39.1 CM/SEC
BH CV XLRA MEAS RIGHT CCA RATIO VEL: 61 CM/SEC
BH CV XLRA MEAS RIGHT DIST CCA EDV: 9.7 CM/SEC
BH CV XLRA MEAS RIGHT DIST CCA PSV: 61 CM/SEC
BH CV XLRA MEAS RIGHT DIST ICA EDV: -17.1 CM/SEC
BH CV XLRA MEAS RIGHT DIST ICA PSV: -91.3 CM/SEC
BH CV XLRA MEAS RIGHT ICA RATIO VEL: -91.3 CM/SEC
BH CV XLRA MEAS RIGHT ICA/CCA RATIO: -1.5
BH CV XLRA MEAS RIGHT PROX CCA EDV: 8.6 CM/SEC
BH CV XLRA MEAS RIGHT PROX CCA PSV: 59.3 CM/SEC
BH CV XLRA MEAS RIGHT PROX ECA PSV: -97.4 CM/SEC
BH CV XLRA MEAS RIGHT PROX ICA EDV: -14.9 CM/SEC
BH CV XLRA MEAS RIGHT PROX ICA PSV: -69.8 CM/SEC
BH CV XLRA MEAS RIGHT PROX SCLA PSV: -77.7 CM/SEC
BH CV XLRA MEAS RIGHT VERTEBRAL A PSV: 56.2 CM/SEC
BLD GP AB SCN SERPL QL: NEGATIVE
BUN SERPL-MCNC: 16 MG/DL (ref 8–23)
BUN/CREAT SERPL: 20.3 (ref 7–25)
CALCIUM SPEC-SCNC: 8.6 MG/DL (ref 8.2–9.6)
CHLORIDE SERPL-SCNC: 104 MMOL/L (ref 98–107)
CO2 SERPL-SCNC: 24 MMOL/L (ref 22–29)
CREAT SERPL-MCNC: 0.79 MG/DL (ref 0.57–1)
DEPRECATED RDW RBC AUTO: 45.9 FL (ref 37–54)
EOSINOPHIL # BLD AUTO: 0.1 10*3/MM3 (ref 0–0.4)
EOSINOPHIL NFR BLD AUTO: 0.9 % (ref 0.3–6.2)
ERYTHROCYTE [DISTWIDTH] IN BLOOD BY AUTOMATED COUNT: 14.7 % (ref 12.3–15.4)
GFR SERPL CREATININE-BSD FRML MDRD: 67 ML/MIN/1.73
GLUCOSE BLDC GLUCOMTR-MCNC: 158 MG/DL (ref 70–105)
GLUCOSE BLDC GLUCOMTR-MCNC: 165 MG/DL (ref 70–105)
GLUCOSE BLDC GLUCOMTR-MCNC: 175 MG/DL (ref 70–105)
GLUCOSE BLDC GLUCOMTR-MCNC: 217 MG/DL (ref 70–105)
GLUCOSE SERPL-MCNC: 160 MG/DL (ref 65–99)
HCT VFR BLD AUTO: 30.1 % (ref 34–46.6)
HGB BLD-MCNC: 10.1 G/DL (ref 12–15.9)
LV EF 2D ECHO EST: 60 %
LYMPHOCYTES # BLD AUTO: 1.3 10*3/MM3 (ref 0.7–3.1)
LYMPHOCYTES NFR BLD AUTO: 23 % (ref 19.6–45.3)
MAGNESIUM SERPL-MCNC: 1.7 MG/DL (ref 1.7–2.3)
MCH RBC QN AUTO: 29.9 PG (ref 26.6–33)
MCHC RBC AUTO-ENTMCNC: 33.5 G/DL (ref 31.5–35.7)
MCV RBC AUTO: 89.5 FL (ref 79–97)
MONOCYTES # BLD AUTO: 0.5 10*3/MM3 (ref 0.1–0.9)
MONOCYTES NFR BLD AUTO: 8.8 % (ref 5–12)
NEUTROPHILS NFR BLD AUTO: 3.9 10*3/MM3 (ref 1.7–7)
NEUTROPHILS NFR BLD AUTO: 66.7 % (ref 42.7–76)
NRBC BLD AUTO-RTO: 0.1 /100 WBC (ref 0–0.2)
PLATELET # BLD AUTO: 165 10*3/MM3 (ref 140–450)
PMV BLD AUTO: 8.6 FL (ref 6–12)
POTASSIUM SERPL-SCNC: 4.2 MMOL/L (ref 3.5–5.2)
RBC # BLD AUTO: 3.36 10*6/MM3 (ref 3.77–5.28)
RH BLD: NEGATIVE
SODIUM SERPL-SCNC: 139 MMOL/L (ref 136–145)
T&S EXPIRATION DATE: NORMAL
TROPONIN T SERPL-MCNC: <0.01 NG/ML (ref 0–0.03)
TROPONIN T SERPL-MCNC: <0.01 NG/ML (ref 0–0.03)
WBC # BLD AUTO: 5.9 10*3/MM3 (ref 3.4–10.8)

## 2020-10-18 PROCEDURE — 86900 BLOOD TYPING SEROLOGIC ABO: CPT

## 2020-10-18 PROCEDURE — 86901 BLOOD TYPING SEROLOGIC RH(D): CPT | Performed by: ORTHOPAEDIC SURGERY

## 2020-10-18 PROCEDURE — 25010000002 CEFTRIAXONE PER 250 MG: Performed by: PHYSICIAN ASSISTANT

## 2020-10-18 PROCEDURE — 97162 PT EVAL MOD COMPLEX 30 MIN: CPT

## 2020-10-18 PROCEDURE — 80048 BASIC METABOLIC PNL TOTAL CA: CPT | Performed by: PHYSICIAN ASSISTANT

## 2020-10-18 PROCEDURE — 84484 ASSAY OF TROPONIN QUANT: CPT | Performed by: PHYSICIAN ASSISTANT

## 2020-10-18 PROCEDURE — 63710000001 INSULIN LISPRO (HUMAN) PER 5 UNITS: Performed by: PHYSICIAN ASSISTANT

## 2020-10-18 PROCEDURE — 93306 TTE W/DOPPLER COMPLETE: CPT

## 2020-10-18 PROCEDURE — 83735 ASSAY OF MAGNESIUM: CPT | Performed by: PHYSICIAN ASSISTANT

## 2020-10-18 PROCEDURE — 86900 BLOOD TYPING SEROLOGIC ABO: CPT | Performed by: ORTHOPAEDIC SURGERY

## 2020-10-18 PROCEDURE — G0378 HOSPITAL OBSERVATION PER HR: HCPCS

## 2020-10-18 PROCEDURE — 97166 OT EVAL MOD COMPLEX 45 MIN: CPT

## 2020-10-18 PROCEDURE — 82962 GLUCOSE BLOOD TEST: CPT

## 2020-10-18 PROCEDURE — 85025 COMPLETE CBC W/AUTO DIFF WBC: CPT | Performed by: PHYSICIAN ASSISTANT

## 2020-10-18 PROCEDURE — 93306 TTE W/DOPPLER COMPLETE: CPT | Performed by: INTERNAL MEDICINE

## 2020-10-18 PROCEDURE — 99225 PR SBSQ OBSERVATION CARE/DAY 25 MINUTES: CPT | Performed by: HOSPITALIST

## 2020-10-18 PROCEDURE — 93880 EXTRACRANIAL BILAT STUDY: CPT

## 2020-10-18 PROCEDURE — 86850 RBC ANTIBODY SCREEN: CPT | Performed by: ORTHOPAEDIC SURGERY

## 2020-10-18 PROCEDURE — 86901 BLOOD TYPING SEROLOGIC RH(D): CPT

## 2020-10-18 RX ADMIN — ACETAMINOPHEN 650 MG: 325 TABLET, FILM COATED ORAL at 20:02

## 2020-10-18 RX ADMIN — TIMOLOL MALEATE: 5 SOLUTION/ DROPS OPHTHALMIC at 20:03

## 2020-10-18 RX ADMIN — CEFTRIAXONE SODIUM 1 G: 1 INJECTION, POWDER, FOR SOLUTION INTRAMUSCULAR; INTRAVENOUS at 17:25

## 2020-10-18 RX ADMIN — Medication 10 ML: at 20:00

## 2020-10-18 RX ADMIN — BRIMONIDINE TARTRATE 1 DROP: 2 SOLUTION/ DROPS OPHTHALMIC at 20:03

## 2020-10-18 RX ADMIN — INSULIN LISPRO 4 UNITS: 100 INJECTION, SOLUTION INTRAVENOUS; SUBCUTANEOUS at 17:25

## 2020-10-18 RX ADMIN — LATANOPROST 1 DROP: 50 SOLUTION/ DROPS OPHTHALMIC at 20:02

## 2020-10-18 NOTE — PLAN OF CARE
Problem: Adult Inpatient Plan of Care  Goal: Plan of Care Review  Outcome: Ongoing, Progressing  Flowsheets  Taken 10/18/2020 1438  Plan of Care Reviewed With: patient  Taken 10/18/2020 1435  Plan of Care Reviewed With: patient  Outcome Summary: Pt is a 89 y/o F presenting s/p fall, sustaining L humerus fx. Ortho will treat as non op for now. Pt resides at an assisted living facility where she receives assistance for all ADLs, except feeding which she completes indep. Pt is ambulatory with a RW at baseline. Pt reports being LHD. Pt is A/O to self only with baseline cognitive impairment of dementia. Pt tolerated session fair. LUE in sling. Pt transferred supine to EOB with min A x 2. Sit to stand with mod A x 2 initially. Pt able to ambulate around room with min-mod A x 2. Pt left seated upright in bedside chair with LUE elevated. OT will continue to see patient for ADL retraining, kelly dressing and ROM exercises 5x/week. OT recommends IP rehab 2/2 high fall risk and significant decline in ADL status. PPE worn: mask, gloves and goggles.

## 2020-10-18 NOTE — DISCHARGE PLACEMENT REQUEST
"Carlo Garcia (89 y.o. Female)     Date of Birth Social Security Number Address Home Phone MRN    09/09/1923  [facility]  [address]  [address]  [place] [address] [phone] [id number]    Yarsanism Marital Status          Rastafarian        Admission Date Admission Type Admitting Provider Attending Provider Department, Room/Bed    10/17/20 Emergency Roxanne Hough MD Nallapuram, Divya, MD [facility] SURGICAL INPATIENT, 4110/1    Discharge Date Discharge Disposition Discharge Destination                       Attending Provider: Roxanne Hough MD    Allergies: Denosumab    Isolation: None   Infection: COVID (rule out) (10/17/20)   Code Status: No CPR    Ht: 154.9 cm (61\")   Wt: 98.4 kg (217 lb)    Admission Cmt: None   Principal Problem: Fall [W19.XXXA]                 Active Insurance as of 10/17/2020     Primary Coverage     Payor Plan Insurance Group Employer/Plan Group    MEDICARE MEDICARE A & B      Payor Plan Address Payor Plan Phone Number Payor Plan Fax Number Effective Dates    [address] [phone]  9/1/1988 - None Entered    [facility] [id number]       Subscriber Name Subscriber Birth Date Member ID       CARLO GARCIA 9/9/1923 [id number]           Secondary Coverage     Payor Plan Insurance Group Employer/Plan Group    [facility] SUPP [id number]     Payor Plan Address Payor Plan Phone Number Payor Plan Fax Number Effective Dates    [address]   12/1/2016 - None Entered    [facility] [id number]       Subscriber Name Subscriber Birth Date Member ID       CARLO GARCIA 9/9/1923 [id number]                 Emergency Contacts      (Rel.) Home Phone Work Phone Mobile Phone    JEANETTE REILLY (Daughter) [phone] -- [phone]    KWAME SMITH (Daughter) [phone] -- [phone]               History & Physical      Russ Zarate PA at 10/17/20 1632                [facility] Medicine " Services      Patient Name: Carlo Garcia  : 1923  MRN: [id number]  Primary Care Physician: Yamilka Jang APRN  Date of admission: 10/17/2020    Patient Care Team:  Yamilka Jang APRN as PCP - General (Nurse Practitioner)  Yamilka Jang APRN as PCP - Claims Attributed          Subjective   History Present Illness     Chief Complaint:   Chief Complaint   Patient presents with   • Fall         Ms. Garcia is a 89 y.o. female presents to [facility] ER 10/17/2020 complaining of a fall versus possible syncopal episode earlier today.  Patient had an unwitnessed fall but she fell to the ground at the assisted living facility.  It is not clear how the patient had ended up on the ground.  Patient does not remember if he passed out and fell or if she had hit her head.  In the ED, patient had complained of some generalized weakness and headache but otherwise denied any acute complaints.  Upon evaluation, patient was alert and oriented 1-2 which is baseline for the patient.  This was confirmed with family.  Patient is cooperative and able to follow commands but is otherwise a poor historian.  After talking with the patient's daughter, Elodia Blue, it is understood that the patient is a DNR.    In the ED, CK normal, initial troponin normal, alk phos elevated at 140, glucose of 269, otherwise unremarkable CMP.  CBC largely unremarkable.  UA shows positive nitrites, 3+ glucose, 3+ bacteria.  Urine culture pending.  X-ray of left humerus shows moderately displaced angulated fracture of indeterminate age proximal metaphysis-left head left humerus CT of the left shoulder joint would be helpful to further evaluate this abnormality.  Left shoulder x-ray shows Suspicion of fracture of indeterminate age of the proximal metaphysis-head of the left humerus that is difficult to visualize because of the severe osteoporosis. CT left shoulder joint would be helpful to further evaluate this abnormality. Fracture of  indeterminate age inferior aspect of the left scapular wing.  Fractures of indeterminate age of the left ribs.  No acute or focal arising left lung.  CT head shows no acute intracranial abnormality seen.  No evidence of fracture of the skull or facial bones.  CT of cervical spine shows no evidence of fracture or dislocation of cervical spine.  There are several anterior wedge compression fractures at multiple levels in the superior thoracic spine and there are probably old and less likely acute.  Clinical correlation, physical examination would be helpful.  CT upper extremity shows moderately-severely displaced angulated acute fracture of proximal metaphysis-head of the left humerus without evidence of dislocation of the left glenohumeral shoulder joint.  Probably old and less likely acute fracture of the posterior inferior aspect of the left scapular wing.  No evidence of acute fracture of the left ribs.  Patient is afebrile, pulse in the 80s, on room air oxygen and 99% SPO2 and blood pressure 130s over 90s.  Patient given morphine 2 mg x 2, Rocephin, Zofran, orthopedic consult.  EKG pending.     Review of Systems   Unable to perform ROS: dementia           Personal History     Past Medical History:   Past Medical History:   Diagnosis Date   • Chronic back pain    • Dementia (CMS/HCC)    • Diabetes mellitus (CMS/HCC)    • Hypertension    • Subarachnoid hemorrhage (CMS/HCC)    • Subdural hematoma (CMS/HCC)    • TBI (traumatic brain injury) (CMS/HCC)    • Vertebral fracture, osteoporotic (CMS/HCC)        Surgical History:    History reviewed. No pertinent surgical history.        Family History: family history includes Heart disease in her sister. Otherwise pertinent FHx was reviewed and unremarkable.     Social History:  reports that she has never smoked. She has never used smokeless tobacco. She reports previous alcohol use. She reports that she does not use drugs.      Medications:  Prior to Admission medications     Medication Sig Start Date End Date Taking? Authorizing Provider   haloperidol (HALDOL) 2 MG/ML solution Take 1 mg by mouth Every 6 (Six) Hours As Needed for Agitation.   Yes Olivia De La Cruz MD   hyoscyamine (LEVSIN) 0.125 MG SL tablet Take 0.125 mg by mouth Every 4 (Four) Hours As Needed for Cramping.   Yes Olivia De La Cruz MD   LORazepam (ATIVAN) 0.5 MG tablet Take 0.5 mg by mouth Every 6 (Six) Hours As Needed for Anxiety.   Yes Olivia De La Cruz MD   prochlorperazine (COMPAZINE) 10 MG tablet Take 10 mg by mouth Every 6 (Six) Hours As Needed for Nausea or Vomiting.   Yes Olivia De La Cruz MD   acetaminophen (TYLENOL) 325 MG tablet Take 650 mg by mouth 2 (Two) Times a Day.    Olivia De La Cruz MD   acetaminophen (TYLENOL) 500 MG tablet Take 500 mg by mouth Every 6 (Six) Hours As Needed for Mild Pain .    Olivia De La Cruz MD   amLODIPine (NORVASC) 5 MG tablet Take 7.5 mg by mouth Daily.    Olivia De La Cruz MD   bimatoprost (LUMIGAN) 0.01 % ophthalmic drops Administer 1 drop to both eyes Every Night.    Olivia De La Cruz MD   brimonidine (ALPHAGAN) 0.2 % ophthalmic solution Administer 1 drop to both eyes 2 (Two) Times a Day.    Olivia De La Cruz MD   carboxymethylcellulose (REFRESH TEARS) 0.5 % solution Administer 1 drop to both eyes 4 (Four) Times a Day. (08:00,12:00,16:00,20:00)    Olivia De La Cruz MD   Cholecalciferol 50 MCG (2000 UT) capsule Take 2,000 Units by mouth Every Morning.    Olivia De La Cruz MD   docusate sodium (COLACE) 100 MG capsule Take 100 mg by mouth Every Morning.    Olivia De La Cruz MD   dorzolamide-timolol (COSOPT) 22.3-6.8 MG/ML ophthalmic solution Administer 1 drop to both eyes 2 (Two) Times a Day.    Olivia De La Cruz MD   hydrocortisone (ANUSOL-HC) 2.5 % rectal cream Insert 1 application into the rectum 2 (Two) Times a Day As Needed for Hemorrhoids.    Olivia De La Cruz MD   loratadine (CLARITIN) 10 MG tablet Take 10 mg by  mouth Daily.    Olivia De La Cruz MD   metFORMIN ER (GLUCOPHAGE-XR) 500 MG 24 hr tablet Take 500 mg by mouth Every Night.    Olivia De La Cruz MD   omeprazole (priLOSEC) 40 MG capsule Take 40 mg by mouth Daily.    Olivia De La Cruz MD   polyethylene glycol (MIRALAX) packet Take 17 g by mouth Daily As Needed (constipation).    Olivia De La Cruz MD   saccharomyces boulardii (FLORASTOR) 250 MG capsule Take 250 mg by mouth Daily.    Olivia De La Cruz MD   donepezil (ARICEPT) 10 MG tablet Take 10 mg by mouth Every Night.  10/17/20  Olivia De La Cruz MD   polyethylene glycol (MIRALAX) packet Take 17 g by mouth Daily.  10/17/20  Olivia De La Cruz MD   simvastatin (ZOCOR) 20 MG tablet Take 20 mg by mouth Every Night.  10/17/20  Olivia De La Cruz MD       Allergies:    Allergies   Allergen Reactions   • Denosumab Itching       Objective   Objective     Vital Signs  Temp:  [97.7 °F (36.5 °C)-98.3 °F (36.8 °C)] 98.3 °F (36.8 °C)  Heart Rate:  [84-93] 92  Resp:  [14-16] 14  BP: (118-165)/(74-95) 143/75  SpO2:  [90 %-100 %] 100 %  on   ;   Device (Oxygen Therapy): room air  Body mass index is 45.64 kg/m².    Physical Exam  Vitals signs and nursing note reviewed.   Constitutional:       General: She is not in acute distress.     Appearance: She is well-developed. She is not diaphoretic.   HENT:      Head: Normocephalic and atraumatic.      Nose: Nose normal.      Mouth/Throat:      Pharynx: No oropharyngeal exudate.   Eyes:      Extraocular Movements: Extraocular movements intact.      Conjunctiva/sclera: Conjunctivae normal.      Pupils: Pupils are equal, round, and reactive to light.   Neck:      Musculoskeletal: Normal range of motion.   Cardiovascular:      Rate and Rhythm: Normal rate and regular rhythm.      Heart sounds: Normal heart sounds.      Comments: S1, S2 audible.  Pulmonary:      Effort: Pulmonary effort is normal. No respiratory distress.      Breath sounds: Normal breath sounds.  No wheezing or rales.      Comments: On room air.  Abdominal:      General: Bowel sounds are normal. There is no distension.      Palpations: Abdomen is soft.      Tenderness: There is no abdominal tenderness. There is no guarding or rebound.      Hernia: No hernia is present.   Musculoskeletal:         General: No tenderness or deformity.      Comments: Patient's left arm in sling currently.  Patient complains of pain in her left shoulder upon movement.   Skin:     General: Skin is warm.      Findings: No erythema or rash.   Neurological:      Mental Status: She is alert and oriented to person, place, and time.      Cranial Nerves: No cranial nerve deficit.   Psychiatric:         Mood and Affect: Mood normal.         Behavior: Behavior normal.         Results Review:  I have personally reviewed most recent cardiac tracings, lab results and radiology images and interpretations and agree with findings.    Results from last 7 days   Lab Units 10/17/20  1325   WBC 10*3/mm3 5.30   HEMOGLOBIN g/dL 12.7   HEMATOCRIT % 37.9   PLATELETS 10*3/mm3 175     Results from last 7 days   Lab Units 10/17/20  1924 10/17/20  1325   SODIUM mmol/L  --  138   POTASSIUM mmol/L  --  3.7   CHLORIDE mmol/L  --  101   CO2 mmol/L  --  25.0   BUN mg/dL  --  11   CREATININE mg/dL  --  0.64   GLUCOSE mg/dL  --  269*   CALCIUM mg/dL  --  9.3   ALT (SGPT) U/L  --  11   AST (SGOT) U/L  --  18   TROPONIN T ng/mL <0.010 <0.010     Estimated Creatinine Clearance: 44.2 mL/min (by C-G formula based on SCr of 0.64 mg/dL).  Brief Urine Lab Results  (Last result in the past 365 days)      Color   Clarity   Blood   Leuk Est   Nitrite   Protein   CREAT   Urine HCG        10/17/20 1350 Yellow Clear Negative Negative Positive Negative               Microbiology Results (last 10 days)     Procedure Component Value - Date/Time    Respiratory Panel PCR w/COVID-19(SARS-CoV-2) [facility]STEN/MINDI/[facility] In-House, NP Swab in UTM/VTM, 3-4 HR TAT - Swab, Nasopharynx  [[id number]]  (Normal) Collected: 10/17/20 1929    Lab Status: Final result Specimen: Swab from Nasopharynx Updated: 10/17/20 2039     ADENOVIRUS, PCR Not Detected     Coronavirus 229E Not Detected     Coronavirus HKU1 Not Detected     Coronavirus NL63 Not Detected     Coronavirus OC43 Not Detected     COVID19 Not Detected     Human Metapneumovirus Not Detected     Human Rhinovirus/Enterovirus Not Detected     Influenza A PCR Not Detected     Influenza A H1 Not Detected     Influenza A H1 2009 PCR Not Detected     Influenza A H3 Not Detected     Influenza B PCR Not Detected     Parainfluenza Virus 1 Not Detected     Parainfluenza Virus 2 Not Detected     Parainfluenza Virus 3 Not Detected     Parainfluenza Virus 4 Not Detected     RSV, PCR Not Detected     Bordetella pertussis pcr Not Detected     Bordetella parapertussis PCR Not Detected     Chlamydophila pneumoniae PCR Not Detected     Mycoplasma pneumo by PCR Not Detected    Narrative:      Fact sheet for providers: https://docs.[employer]/wp-content/uploads/DQH7834-7840-BP8.1-EUA-Provider-Fact-Sheet-3.pdf    Fact sheet for patients: https://docs.[employer]/wp-content/uploads/FXL0463-5687-UH0.1-EUA-Patient-Fact-Sheet-1.pdf          ECG/EMG Results (most recent)     None                    Xr Shoulder 2+ View Left    Result Date: 10/17/2020   1. Suspicion of fracture of indeterminate age of the proximal metaphysis-head of the left humerus that is difficult to visualize because of the severe osteoporosis. CT left shoulder joint would be helpful to further evaluate this abnormality 2. Fracture of indeterminate age inferior aspect left scapula wing. 3. Fractures of indeterminate age in the left ribs. 4. No acute or focal arising left lung.  Electronically Signed By-DR. Kal Feldman MD On:10/17/2020 1:47 PM This report was finalized on [id number] by DR. Kal Feldman MD.    Xr Humerus Left    Result Date: 10/17/2020   1. Moderately displaced  angulated fracture of indeterminate age proximal metaphysis-left head left humerus CT of the left shoulder joint would be helpful to further evaluate this abnormality.  Electronically Signed By-DR. Kal Feldman MD On:10/17/2020 1:52 PM This report was finalized on [id number] by DR. Kal Feldman MD.    Ct Head Without Contrast    Result Date: 10/17/2020   1. No acute intracranial abnormality seen. 2. No evidence of fracture the skull or facial bones.  Electronically Signed By-DR. Kal Feldman MD On:10/17/2020 2:55 PM This report was finalized on [id number] by DR. Kal Feldman MD.    Ct Cervical Spine Without Contrast    Result Date: 10/17/2020   1. No evidence of fracture or dislocation cervical spine. 2. There are several anterior wedge compression fractures at multiple levels in the superior thoracic spine there are probably old and less likely acute. Clinical correlation physical examination would BE helpful  Electronically Signed By-DR. Kal Feldman MD On:10/17/2020 2:59 PM This report was finalized on [id number] by DR. Kal Feldman MD.    Xr Chest 1 View    Result Date: 10/17/2020   1. Acute fracture of the proximal metaphysis-head left humerus. 2. Old appearing fractures left ribs. 3. A descending thoracic aorta and arch the thoracic aorta are tortuous and dilated. This causes the trachea to be deviated to the right 4. Chronic lung disease. 5. Cardiomegaly  Electronically Signed By-DR. Kal Feldman MD On:10/17/2020 8:31 PM This report was finalized on [id number] by DR. Kal Feldman MD.    Ct Upper Extremity Left Without Contrast    Result Date: 10/17/2020   1. Moderately-severely displaced angulated acute fracture the proximal metaphysis-head of the left humerus without evidence of dislocation of the left glenohumeral shoulder joint. 2. Probably old and less likely acute fracture of the posterior inferior aspect of the left scapular  wing. 3. No evidence of acute fracture of the left ribs.  Electronically Signed By-DR. Kal Feldman MD On:10/17/2020 3:05 PM This report was finalized on [id number] by DR. Kal Feldman MD.        Estimated Creatinine Clearance: 44.2 mL/min (by C-G formula based on SCr of 0.64 mg/dL).    Assessment/Plan   Assessment/Plan       Active Hospital Problems    Diagnosis  POA   • **Fall [W19.XXXA]  Yes   • Morbid obesity (CMS/HCC) [E66.01]  Yes   • GERD (gastroesophageal reflux disease) [K21.9]  Yes   • Chronic constipation [K59.09]  Yes   • Anxiety [F41.9]  Yes   • Hyperglycemia [R73.9]  Yes   • Left humeral fracture [S42.302A]  Yes   • UTI (urinary tract infection) [N39.0]  Yes   • Dementia (CMS/HCC) [F03.90]  Yes   • Essential hypertension [I10]  Yes   • Subdural hematoma (CMS/HCC) [S06.5X9A]  Yes   • Subarachnoid hemorrhage (CMS/HCC) [I60.9]  Yes   • Personal history of traumatic brain injury [Z87.820]  Not Applicable   • Diabetes mellitus, type II (CMS/HCC) [E11.9]  Yes      Resolved Hospital Problems   No resolved problems to display.       Unwitnessed fall   -Unwitnessed fall earlier today at assisted living facility, unclear if patient had a syncopal episode versus underlying infection versus mechanical fall as patient is poor historian with underlying dementia but is seemingly at baseline mentation after discussion with family  -CK normal,   -initial troponin normal, alk phos elevated at 140,   - otherwise unremarkable CMP.    -CBC largely unremarkable.    -UA shows positive nitrites, 3+ glucose, 3+ bacteria.  Urine culture pending.     -CT head shows no acute intracranial abnormality seen.  No evidence of fracture of the skull or facial bones.    -CT of cervical spine shows no evidence of fracture or dislocation of cervical spine.  There are several anterior wedge compression fractures at multiple levels in the superior thoracic spine and there are probably old and less likely acute.  Clinical  correlation, physical examination would be helpful.    -Patient is afebrile, pulse in the 80s, on room air oxygen and 99% SPO2 and blood pressure 130s over 90s.    -Patient given morphine 2 mg x 2, Rocephin, Zofran, orthopedic consult.  EKG pending.   -Check chest x-ray, TSH, echo, carotid Dopplers, serial troponins, COVID-19 swab  -Fall precautions, cardiac monitoring  -PT/OT consult  -Diabetic diet, advance as tolerated    UTI  -Continue Rocephin    Left humeral fracture  -X-ray of left humerus shows moderately displaced angulated fracture of indeterminate age proximal metaphysis-left head left humerus CT of the left shoulder joint would be helpful to further evaluate this abnormality.    -Left shoulder x-ray shows suspicion of fracture of indeterminate age of the proximal metaphysis-head of the left humerus that is difficult to visualize because of the severe osteoporosis. CT left shoulder joint would be helpful to further evaluate this abnormality.  -CT upper extremity shows moderately-severely displaced angulated acute fracture of proximal metaphysis-head of the left humerus without evidence of dislocation of the left glenohumeral shoulder joint.  Probably old and less likely acute fracture of the posterior inferior aspect of the left scapular wing.  No evidence of acute fracture of the left ribs.    -Orthopedic surgery consult  -Pain control tramadol  -Diabetic diet until n.p.o. midnight    Diabetes mellitus type 2 with hyperglycemia  -glucose of 269,  -SSI, Accu-Cheks 3 times daily before meals  -Check A1c  -Hold home Metformin    Dementia  -Seemingly at baseline mentation after discussion with family  -Continue home Haldol as needed for agitation    History of subdural hematoma, subarachnoid hemorrhage, personal history of traumatic brain injury    Anxiety  -Continue home Ativan as needed, inspect verified    Essential hypertension, chronic, moderately controlled  -Continue home Norvasc    Chronic  constipation  -Continue home MiraLAX, Colace    GERD  -Continue home PPI    Morbid obesity, BMI 45.64  -Encourage lifestyle modifications      VTE Prophylaxis - SCDs  Mechanical Order History:     None      Pharmalogical Order History:     None          CODE STATUS:    Code Status and Medical Interventions:   Ordered at: 10/17/20 1820     Limited Support to NOT Include:    Other     Level Of Support Discussed With:    Health Care Surrogate     Code Status:    No CPR     Medical Interventions (Level of Support Prior to Arrest):    Limited     Limited Support to Also NOT Include:    no cpr       This patient has been examined wearing appropriate Personal Protective Equipment and discussed with hospital infection control department. 10/17/20      I discussed the patient's findings and my recommendations with patient.        Electronically signed by GRICELDA Victoria, 10/17/20, 4:32 PM EDT.  [facility] Hospitalist Team          Electronically signed by Russ Zarate PA at 10/17/20 6014       Physician Progress Notes (last 24 hours) (Notes from 10/17/20 1126 through 10/18/20 1126)    No notes of this type exist for this encounter.            Physical Therapy Notes (last 24 hours) (Notes from 10/17/20 1126 through 10/18/20 1126)      Josefina Okeefe PT at 10/18/20 1024  Version 1 of 1         Problem: Adult Inpatient Plan of Care  Goal: Plan of Care Review  Recent Flowsheet Documentation  Taken 10/18/2020 1020 by Josefina Okeefe PT  Plan of Care Reviewed With: patient  Outcome Summary: 89 yo female assisted living resident admitted after a fall with L humerus fx. Plans for non op treatment in a sling.  Pt is oriented to her name only, pleasantly confused.  Per facility transfer report pt is normally ambulatory with a RW.  Pt required mod A to transfer OOB, min/mod A x2 to ambulate in room, up to chair.  Pt at high risk for falls.  Recommend IP rehab at d/c, not safe for assisted living setting in current condition.  PPE worn:   gloves, mask, goggles.       Electronically signed by Josefina Okeefe, PT at 10/18/20 1024     Josefina Okeefe, PT at 10/18/20 1025  Version 1 of 1         Patient Name: Carlo Garcia  : 1923    MRN: [id number]                              Today's Date: 10/18/2020       Admit Date: 10/17/2020    Visit Dx:     ICD-10-CM ICD-9-CM   1. Fall, initial encounter  W19.XXXA E888.9   2. Syncope, unspecified syncope type  R55 780.2   3. Contusion of scalp, initial encounter  S00.03XA 920   4. Other closed displaced fracture of proximal end of left humerus, initial encounter  S42.292A 812.09     Patient Active Problem List   Diagnosis   • Fall   • Dementia (CMS/HCC)   • Essential hypertension   • Head injury, closed, initial encounter   • Laceration of skin of forehead   • Acquired scoliosis   • Disorder of lung   • Hemorrhoids   • Increased frequency of urination   • Lung nodule   • Memory loss   • Onychomycosis   • Osteoporosis   • Personal history of (healed) other pathological fracture   • Personal history of traumatic brain injury   • Subarachnoid hemorrhage (CMS/HCC)   • Subdural hematoma (CMS/HCC)   • Diabetes mellitus, type II (CMS/HCC)   • Acute low back pain   • Osteoarthritis of knee   • Morbid obesity (CMS/HCC)   • GERD (gastroesophageal reflux disease)   • Chronic constipation   • Anxiety   • Hyperglycemia   • Left humeral fracture   • UTI (urinary tract infection)     Past Medical History:   Diagnosis Date   • Chronic back pain    • Dementia (CMS/HCC)    • Diabetes mellitus (CMS/HCC)    • Hypertension    • Subarachnoid hemorrhage (CMS/HCC)    • Subdural hematoma (CMS/HCC)    • TBI (traumatic brain injury) (CMS/HCC)    • Vertebral fracture, osteoporotic (CMS/HCC)      History reviewed. No pertinent surgical history.  General Information     Row Name 10/18/20 1017          Physical Therapy Time and Intention    Document Type  evaluation  -     Mode of Treatment  physical therapy  -     Row Name 10/18/20  1017          General Information    Patient Profile Reviewed  yes  -     Prior Level of Function  independent: with walker per facility transfer report  -     Existing Precautions/Restrictions  fall;non-weight bearing  -     Barriers to Rehab  -- LUE in sling  -     Row Name 10/18/20 1017          Living Environment    Lives With  facility resident assisted living at [facility]  -     Row Name 10/18/20 1017          Cognition    Orientation Status (Cognition)  oriented to;person states her first name, pleasantly confused, smiles when asked questions  -     Row Name 10/18/20 1017          Safety Issues, Functional Mobility    Safety Issues Affecting Function (Mobility)  insight into deficits/self-awareness;judgment;safety precautions follow-through/compliance  -     Impairments Affecting Function (Mobility)  balance;cognition;endurance/activity tolerance;strength;pain;range of motion (ROM);sensation/sensory awareness  -       User Key  (r) = Recorded By, (t) = Taken By, (c) = Cosigned By    Initials Name Provider Type     Josefina Okeefe PT Physical Therapist        Mobility     Row Name 10/18/20 1018          Bed Mobility    Bed Mobility  supine-sit  -     Supine-Sit [place] (Bed Mobility)  moderate assist (50% patient effort)  -     Assistive Device (Bed Mobility)  draw sheet;head of bed elevated  -[facility] Name 10/18/20 1018          Sit-Stand Transfer    Sit-Stand [place] (Transfers)  moderate assist (50% patient effort);2 person assist  -[facility] Name 10/18/20 1018          Gait/Stairs (Locomotion)    [place] Level (Gait)  minimum assist (75% patient effort);moderate assist (50% patient effort);2 person assist  -     Distance in Feet (Gait)  gait belt and R side hand held assist, around bed to chair x 10'  -     Deviations/Abnormal Patterns (Gait)  base of support, narrow;festinating/shuffling  -     Bilateral Gait Deviations  forward flexed posture  -      [facility] Name 10/18/20 1018          Mobility    Extremity Weight-bearing Status  left upper extremity  -     Left Upper Extremity (Weight-bearing Status)  non weight-bearing (NWB)  -       User Key  (r) = Recorded By, (t) = Taken By, (c) = Cosigned By    Initials Name Provider Type    Josefina Bacon, AD Physical Therapist        Obj/Interventions     Row Name 10/18/20 1019          Range of Motion Comprehensive    Comment, General Range of Motion  BLE AROM WFLs, LUE in sling  -[facility]     Row Name 10/18/20 1019          Strength Comprehensive (MMT)    Comment, General Manual Muscle Testing (MMT) Assessment  gross LE observed 3/5, not following directions for formal testing  -[facility] Name 10/18/20 1019          Balance    Balance Assessment  sitting static balance;standing static balance;sitting dynamic balance;standing dynamic balance  -     Static Sitting Balance  mild impairment;sitting, edge of bed  -     Dynamic Sitting Balance  sitting, edge of bed;moderate impairment;unsupported  -     Static Standing Balance  moderate impairment  -     Dynamic Standing Balance  moderate impairment  -[facility] 10/18/20 1019          Sensory Assessment (Somatosensory)    Sensory Assessment (Somatosensory)  unable/difficult to assess for LUE  -       User Key  (r) = Recorded By, (t) = Taken By, (c) = Cosigned By    Initials Name Provider Type    Josefina Bacon PT Physical Therapist        Goals/Plan     Row Name 10/18/20 1023          Bed Mobility Goal 1 (PT)    Activity/Assistive Device (Bed Mobility Goal 1, PT)  sit to supine/supine to sit  -     [place] Level/Cues Needed (Bed Mobility Goal 1, PT)  minimum assist (75% or more patient effort);1 person assist  -     Time Frame (Bed Mobility Goal 1, PT)  long term goal (LTG);2 weeks  -[facility]     Row Name 10/18/20 1023          Transfer Goal 1 (PT)    Activity/Assistive Device (Transfer Goal 1, PT)  sit-to-stand/stand-to-sit;bed-to-chair/chair-to-bed  -      [place] Level/Cues Needed (Transfer Goal 1, PT)  contact guard assist  -     Time Frame (Transfer Goal 1, PT)  long term goal (LTG);2 weeks  -     Row Name 10/18/20 1023          Gait Training Goal 1 (PT)    Activity/Assistive Device (Gait Training Goal 1, PT)  gait (walking locomotion);assistive device use;walker, kelly;cane, straight  -     [place] Level (Gait Training Goal 1, PT)  minimum assist (75% or more patient effort);1 person assist  -     Distance (Gait Training Goal 1, PT)  50'  -     Time Frame (Gait Training Goal 1, PT)  long term goal (LTG);2 weeks  -       User Key  (r) = Recorded By, (t) = Taken By, (c) = Cosigned By    Initials Name Provider Type    Josefina Bacon, PT Physical Therapist        Clinical Impression     Row Name 10/18/20 1020          Pain    Additional Documentation  Pain Scale: FACES Pre/Post-Treatment (Group)  -     Row Name 10/18/20 1020          Pain Scale: FACES Pre/Post-Treatment    Pain: FACES Scale, Pretreatment  2-->hurts little bit  -     Posttreatment Pain Rating  2-->hurts little bit  -     Pain Location - Side  Left  -     Pain Location - Orientation  upper  -     Pain Location  extremity  -     Row Name 10/18/20 1020          Plan of Care Review    Plan of Care Reviewed With  patient  -     Outcome Summary  89 yo female assisted living resident admitted after a fall with L humerus fx. Plans for non op treatment in a sling.  Pt is oriented to her name only, pleasantly confused.  Per facility transfer report pt is normally ambulatory with a RW.  Pt required mod A to transfer OOB, min/mod A x2 to ambulate in room, up to chair.  Pt at high risk for falls.  Recommend IP rehab at d/c, not safe for assisted living setting in current condition.  PPE worn:  gloves, mask, goggles.  -     Row Name 10/18/20 1020          Therapy Assessment/Plan (PT)    Rehab Potential (PT)  good, to achieve stated therapy goals  -     Criteria for Skilled  Interventions Met (PT)  yes;skilled treatment is necessary  -     Row Name 10/18/20 1020          Vital Signs    Posttreatment Heart Rate (beats/min)  95  -     Pre SpO2 (%)  96  -     O2 Delivery Pre Treatment  room air  -     Intra SpO2 (%)  97  -     O2 Delivery Intra Treatment  room air  -     Post SpO2 (%)  97  -     O2 Delivery Post Treatment  room air  -     Row Name 10/18/20 1020          Positioning and Restraints    Pre-Treatment Position  in bed  -     Post Treatment Position  chair  -     In Chair  notified nsg;sitting;call light within reach;encouraged to call for assist;exit alarm on  -       User Key  (r) = Recorded By, (t) = Taken By, (c) = Cosigned By    Initials Name Provider Type    Josefina Bacon PT Physical Therapist        Outcome Measures    No documentation.       Physical Therapy Education                 Title: PT OT SLP Therapies (In Progress)     Topic: Physical Therapy (In Progress)     Point: Mobility training (In Progress)     Learning Progress Summary           Patient Acceptance, E, NL by  at 10/18/2020 1024                   Point: Home exercise program (Not Started)     Learner Progress:  Not documented in this visit.          Point: Body mechanics (Not Started)     Learner Progress:  Not documented in this visit.          Point: Precautions (In Progress)     Learning Progress Summary           Patient Acceptance, E, NL by  at 10/18/2020 1024                               User Key     Initials Effective Dates Name Provider Type [facility] 03/01/19 -  Josefina Okeefe PT Physical Therapist PT              PT Recommendation and Plan  Planned Therapy Interventions (PT): balance training, bed mobility training, gait training, transfer training, strengthening, ROM (range of motion)  Plan of Care Reviewed With: patient  Outcome Summary: 89 yo female assisted living resident admitted after a fall with L humerus fx. Plans for non op treatment in a sling.   Pt is oriented to her name only, pleasantly confused.  Per facility transfer report pt is normally ambulatory with a RW.  Pt required mod A to transfer OOB, min/mod A x2 to ambulate in room, up to chair.  Pt at high risk for falls.  Recommend IP rehab at d/c, not safe for assisted living setting in current condition.  PPE worn:  gloves, mask, goggles.     Time Calculation:   PT Charges     Row Name 10/18/20 1025             Time Calculation    Start Time  0940  -      Stop Time  1000  -      Time Calculation (min)  20 min  -      PT Received On  10/18/20  -      PT - Next Appointment  10/19/20  -      PT Goal Re-Cert Due Date  11/01/20  -         Time Calculation- PT    Total Timed Code Minutes- PT  0 minute(s)  -        User Key  (r) = Recorded By, (t) = Taken By, (c) = Cosigned By    Initials Name Provider Type    Josefina Bacon, AD Physical Therapist        Therapy Charges for Today     Code Description Service Date Service Provider Modifiers Qty    01014852289 HC PT EVAL MOD COMPLEXITY 2 10/18/2020 Josefina Okeefe, PT GP 1               Josefina Okeefe PT  10/18/2020      Electronically signed by Josefina Okeefe PT at 10/18/20 1026

## 2020-10-18 NOTE — THERAPY EVALUATION
Patient Name: Carlo Garcia  : 1923    MRN: [id number]                              Today's Date: 10/18/2020       Admit Date: 10/17/2020    Visit Dx:     ICD-10-CM ICD-9-CM   1. Fall, initial encounter  W19.XXXA E888.9   2. Syncope, unspecified syncope type  R55 780.2   3. Contusion of scalp, initial encounter  S00.03XA 920   4. Other closed displaced fracture of proximal end of left humerus, initial encounter  S42.292A 812.09     Patient Active Problem List   Diagnosis   • Fall   • Dementia (CMS/HCC)   • Essential hypertension   • Head injury, closed, initial encounter   • Laceration of skin of forehead   • Acquired scoliosis   • Disorder of lung   • Hemorrhoids   • Increased frequency of urination   • Lung nodule   • Memory loss   • Onychomycosis   • Osteoporosis   • Personal history of (healed) other pathological fracture   • Personal history of traumatic brain injury   • Subarachnoid hemorrhage (CMS/HCC)   • Subdural hematoma (CMS/HCC)   • Diabetes mellitus, type II (CMS/HCC)   • Acute low back pain   • Osteoarthritis of knee   • Morbid obesity (CMS/HCC)   • GERD (gastroesophageal reflux disease)   • Chronic constipation   • Anxiety   • Hyperglycemia   • Left humeral fracture   • UTI (urinary tract infection)     Past Medical History:   Diagnosis Date   • Chronic back pain    • Dementia (CMS/HCC)    • Diabetes mellitus (CMS/HCC)    • Hypertension    • Subarachnoid hemorrhage (CMS/HCC)    • Subdural hematoma (CMS/HCC)    • TBI (traumatic brain injury) (CMS/HCC)    • Vertebral fracture, osteoporotic (CMS/HCC)      History reviewed. No pertinent surgical history.  General Information     Row Name 10/18/20 1426          OT Time and Intention    Document Type  evaluation  -ARTEMIO     Mode of Treatment  occupational therapy  -ARTEMIO     Row Name 10/18/20 1426          General Information    Patient Profile Reviewed  yes  -ARTEMIO     Prior Level of Function  independent:;feeding;transfer Pt is an ECF resident and  recieves assist for all ADLs, except feeding.  -ARTEMIO     Existing Precautions/Restrictions  non-weight bearing;fall  -     Barriers to Rehab  previous functional deficit;cognitive status  -     Row Name 10/18/20 1426          Occupational Profile    Reason for Services/Referral (Occupational Profile)  Pt is a 89 y/o F presenting s/p fall, sustaining L humerus fx. Ortho will treat as non op for now. Pt resides at an assisted living facility where she receives assistance for all ADLs, except feeding which she completes indep. Pt is ambulatory with a RW at baseline.  -     Row Name 10/18/20 1426          Living Environment    Lives With  facility resident  -     Row Name 10/18/20 1426          Cognition    Orientation Status (Cognition)  oriented to;person  -     Row Name 10/18/20 1426          Safety Issues, Functional Mobility    Safety Issues Affecting Function (Mobility)  insight into deficits/self-awareness;judgment;safety precautions follow-through/compliance  -     Impairments Affecting Function (Mobility)  balance;cognition;endurance/activity tolerance;range of motion (ROM);sensation/sensory awareness;strength  -     Cognitive Impairments, Mobility Safety/Performance  insight into deficits/self-awareness;judgment;safety precaution follow-through  -       User Key  (r) = Recorded By, (t) = Taken By, (c) = Cosigned By    Initials Name Provider Type    ARTEMIO Rosalinda Lopez OT Occupational Therapist        Mobility/ADL's     Row Name 10/18/20 1432          Bed Mobility    Bed Mobility  supine-sit  -     Supine-Sit [place] (Bed Mobility)  moderate assist (50% patient effort)  -     Bed Mobility, Safety Issues  decreased use of arms for pushing/pulling;cognitive deficits limit understanding  -     Assistive Device (Bed Mobility)  draw sheet;head of bed elevated  -     Row Name 10/18/20 1432          Transfers    Transfers  bed-chair transfer  -     Bed-Chair [place] (Transfers)   minimum assist (75% patient effort);2 person assist;moderate assist (50% patient effort)  -     Sit-Stand [place] (Transfers)  2 person assist;moderate assist (50% patient effort)  -[facility] Name 10/18/20 1432          Functional Mobility    Functional Mobility- Ind. Level  minimum assist (75% patient effort);moderate assist (50% patient effort);2 person assist required  -     Functional Mobility-Distance (Feet)  8  -     Functional Mobility-Maintain WBing  able to maintain weight bearing status  -     Row Name 10/18/20 1432          Activities of Daily Living    BADL Assessment/Intervention  lower body dressing  -[facility] Name 10/18/20 1432          Mobility    Extremity Weight-bearing Status  left upper extremity  -     Left Upper Extremity (Weight-bearing Status)  non weight-bearing (NWB)  -[facility] Name 10/18/20 [facility]          Lower Body Dressing Assessment/Training    [place] Level (Lower Body Dressing)  doff;socks;maximum assist (25% patient effort)  -     Position (Lower Body Dressing)  edge of bed sitting  -       User Key  (r) = Recorded By, (t) = Taken By, (c) = Cosigned By    Initials Name Provider Type     Rosalinda Lopez OT Occupational Therapist        Obj/Interventions     [facility] Name 10/18/20 1433          Sensory Assessment (Somatosensory)    Sensory Assessment (Somatosensory)  left UE  -     Left UE Sensory Assessment  deep pressure awareness Light touch is abscent in ulnar and radial nerve distributions.  -     Row Name 10/18/20 1433          Vision Assessment/Intervention    Visual Impairment/Limitations  WFL  -[facility] Name 10/18/20 1433          Range of Motion Comprehensive    Comment, General Range of Motion  RUE AROM WFL; LUE proximally NT due to medical restrictions. Elbow/Wrist/Digits AROM WFL  -     Row Name 10/18/20 1433          Strength Comprehensive (MMT)    Comment, General Manual Muscle Testing (MMT) Assessment  RUE WFL; LUE NT due to  medical restrictions  -ARTEMIO     Row Name 10/18/20 1433          Balance    Static Sitting Balance  mild impairment;sitting, edge of bed  -ARTEMIO     Dynamic Sitting Balance  moderate impairment;sitting, edge of bed;unsupported  -ARTEMIO     Static Standing Balance  moderate impairment;supported;standing  -ARTEMIO     Dynamic Standing Balance  moderate impairment;supported;standing  -ARTEMIO       User Key  (r) = Recorded By, (t) = Taken By, (c) = Cosigned By    Initials Name Provider Type    ARTEMIO Rosalinda Lopez, DALIA Occupational Therapist        Goals/Plan     Row Name 10/18/20 1436          Transfer Goal 1 (OT)    Activity/Assistive Device (Transfer Goal 1, OT)  sit-to-stand/stand-to-sit;bed-to-chair/chair-to-bed;commode, bedside without drop arms;walker, kelly  -ARTEMIO     [place] Level/Cues Needed (Transfer Goal 1, OT)  set-up required;standby assist  -ARTEMIO     Time Frame (Transfer Goal 1, OT)  long term goal (LTG);2 weeks  -ARTEMIO     Progress/Outcome (Transfer Goal 1, OT)  continuing progress toward goal  -ARTEMIO     Row Name 10/18/20 1436          Bathing Goal 1 (OT)    Activity/Device (Bathing Goal 1, OT)  upper body bathing;lower body bathing;hand-held shower spray hose;grab bar, tub/shower;shower chair;long-handled sponge  -ARTEMIO     [place] Level/Cues Needed (Bathing Goal 1, OT)  set-up required;contact guard assist;1 person assist  -ARTEMIO     Time Frame (Bathing Goal 1, OT)  long term goal (LTG);2 weeks  -ARTEMIO     Progress/Outcomes (Bathing Goal 1, OT)  continuing progress toward goal  -ARTEMIO     Row Name 10/18/20 1436          Dressing Goal 1 (OT)    Activity/Device (Dressing Goal 1, OT)  upper body dressing;lower body dressing  -ARTEMIO     [place]/Cues Needed (Dressing Goal 1, OT)  set-up required;minimum assist (75% or more patient effort);1 person assist  -ARTEMIO     Time Frame (Dressing Goal 1, OT)  long term goal (LTG);2 weeks  -ARTEMIO     Progress/Outcome (Dressing Goal 1, OT)  continuing progress toward goal  -ARTEMIO     Row Name 10/18/20  1436          Grooming Goal 1 (OT)    Activity/Device (Grooming Goal 1, OT)  grooming skills, all  -ARTEMIO     [place] (Grooming Goal 1, OT)  set-up required;standby assist;1 person assist  -ARTEMIO     Time Frame (Grooming Goal 1, OT)  long term goal (LTG);2 weeks  -ARTEMIO     Row Name 10/18/20 1436          ROM Goal 1 (OT)    ROM Goal 1 (OT)  Pt will be indep with ROM HEP of all distal joints from L shoulder to prevent risk for joint contracture/soft tissue shortening, limiting ADL indep.  -ARTEMIO     Time Frame (ROM Goal 1, OT)  long term goal (LTG);2 weeks  -ARTEMIO     Progress/Outcome (ROM Goal 1, OT)  continuing progress toward goal  -ARTEMIO     Row Name 10/18/20 1436          Therapy Assessment/Plan (OT)    Planned Therapy Interventions (OT)  activity tolerance training;functional balance retraining;occupation/activity based interventions;ROM/therapeutic exercise;strengthening exercise;transfer/mobility retraining;patient/caregiver education/training;BADL retraining  -ARTEMIO       User Key  (r) = Recorded By, (t) = Taken By, (c) = Cosigned By    Initials Name Provider Type    ARTEMIO Rosalinda Lopez, DALIA Occupational Therapist        Clinical Impression     Row Name 10/18/20 1435          Pain Scale: FACES Pre/Post-Treatment    Pain: FACES Scale, Pretreatment  2-->hurts little bit  -ARTEMIO     Posttreatment Pain Rating  2-->hurts little bit  -ARTEMIO     Pain Location - Side  Left  -ARTEMIO     Pain Location - Orientation  upper  -ARTEMIO     Pain Location  shoulder  -ARTEMIO     Row Name 10/18/20 1435          Plan of Care Review    Plan of Care Reviewed With  patient  -ARTEMIO     Outcome Summary  Pt is a 89 y/o F presenting s/p fall, sustaining L humerus fx. Ortho will treat as non op for now. Pt resides at an assisted living facility where she receives assistance for all ADLs, except feeding which she completes indep. Pt is ambulatory with a RW at baseline. Pt reports being LHD. Pt is A/O to self only with baseline cognitive impairment of dementia. Pt  tolerated session fair. LUE in sling. Pt transferred supine to EOB with min A x 2. Sit to stand with mod A x 2 initially. Pt able to ambulate around room with min-mod A x 2. Pt left seated upright in bedside chair with LUE elevated. OT will continue to see patient for ADL retraining, kelly dressing and ROM exercises 5x/week. OT recommends IP rehab 2/2 high fall risk and significant decline in ADL status. PPE worn: mask, gloves and goggles.  -ARTEMIO     Row Name 10/18/20 1433          Therapy Assessment/Plan (OT)    Rehab Potential (OT)  fair, will monitor progress closely  -ARTEMIO     Criteria for Skilled Therapeutic Interventions Met (OT)  yes;meets criteria;skilled treatment is necessary  -ARTEMIO     Therapy Frequency (OT)  5 times/wk  -ARTEMIO     Row Name 10/18/20 1431          Therapy Plan Review/Discharge Plan (OT)    Anticipated Discharge Disposition (OT)  inpatient rehabilitation facility  -     Row Name 10/18/20 1431          Vital Signs    Pretreatment Heart Rate (beats/min)  95  -ARTEMIO     Intratreatment Heart Rate (beats/min)  97  -ARTEMIO     Posttreatment Heart Rate (beats/min)  97  -ARTEMIO     Pre SpO2 (%)  95  -ARTEMIO     O2 Delivery Pre Treatment  room air  -ARTEMIO     Intra SpO2 (%)  97  -ARTEMIO     O2 Delivery Intra Treatment  room air  -ARTEMIO     Post SpO2 (%)  97  -ARTEMIO     O2 Delivery Post Treatment  room air  -ARTEMIO     Row Name 10/18/20 1436          Positioning and Restraints    Pre-Treatment Position  in bed  -ARTEMIO     Post Treatment Position  chair  -ARTEMIO     In Chair  notified nsg;sitting;call light within reach;encouraged to call for assist;exit alarm on;LUE elevated  -ARTEMIO       User Key  (r) = Recorded By, (t) = Taken By, (c) = Cosigned By    Initials Name Provider Type    Rosalinda Hernandez, OT Occupational Therapist        Outcome Measures     Row Name 10/18/20 4998          How much help from another is currently needed...    Putting on and taking off regular lower body clothing?  1  -ARTEMIO     Bathing (including washing, rinsing,  and drying)  2  -ARTEMIO     Toileting (which includes using toilet bed pan or urinal)  1  -ARTEMIO     Putting on and taking off regular upper body clothing  2  -ARTEMIO     Taking care of personal grooming (such as brushing teeth)  2  -ARTEMIO     Eating meals  2  -ARTEMIO     AM-PAC 6 Clicks Score (OT)  10  -ARTEMIO     Row Name 10/18/20 1437          Functional Assessment    Outcome Measure Options  AM-PAC 6 Clicks Daily Activity (OT)  -ARTEMIO       User Key  (r) = Recorded By, (t) = Taken By, (c) = Cosigned By    Initials Name Provider Type    Rosalinda Hernandez OT Occupational Therapist        Occupational Therapy Education                 Title: PT OT SLP Therapies (In Progress)     Topic: Occupational Therapy (Done)     Point: Home exercise program (Done)     Description:   Instruct learner(s) on appropriate technique for monitoring, assisting and/or progressing therapeutic exercises/activities.              Learning Progress Summary           Patient Acceptance, E,D, VU,DU,NR by  at 10/18/2020 1438                   Point: Precautions (Done)     Description:   Instruct learner(s) on prescribed precautions during self-care and functional transfers.              Learning Progress Summary           Patient Acceptance, E,D, VU,DU,NR by  at 10/18/2020 1438                               User Key     Initials Effective Dates Name Provider Type Discipline    ARTEMIO 07/15/20 -  Rosalinda Lopez OT Occupational Therapist OT              OT Recommendation and Plan  Planned Therapy Interventions (OT): activity tolerance training, functional balance retraining, occupation/activity based interventions, ROM/therapeutic exercise, strengthening exercise, transfer/mobility retraining, patient/caregiver education/training, BADL retraining  Therapy Frequency (OT): 5 times/wk  Plan of Care Review  Plan of Care Reviewed With: patient  Outcome Summary: Pt is a 89 y/o F presenting s/p fall, sustaining L humerus fx. Ortho will treat as non op for now. Pt  resides at an assisted living facility where she receives assistance for all ADLs, except feeding which she completes indep. Pt is ambulatory with a RW at baseline. Pt reports being LHD. Pt is A/O to self only with baseline cognitive impairment of dementia. Pt tolerated session fair. LUE in sling. Pt transferred supine to EOB with min A x 2. Sit to stand with mod A x 2 initially. Pt able to ambulate around room with min-mod A x 2. Pt left seated upright in bedside chair with LUE elevated. OT will continue to see patient for ADL retraining, kelly dressing and ROM exercises 5x/week. OT recommends IP rehab 2/2 high fall risk and significant decline in ADL status. PPE worn: mask, gloves and goggles.     Time Calculation:   Time Calculation- OT     Row Name 10/18/20 1439             Time Calculation- OT    OT Start Time  0949  -ARTEMIO      OT Stop Time  1004  -ARTEMIO      OT Time Calculation (min)  15 min  -ARTEMIO      Total Timed Code Minutes- OT  15 minute(s)  -ARTEMIO      OT Received On  10/18/20  -ARTEMIO      OT - Next Appointment  10/19/20  -ARTEMIO      OT Goal Re-Cert Due Date  11/01/20  -ARTEMIO        User Key  (r) = Recorded By, (t) = Taken By, (c) = Cosigned By    Initials Name Provider Type    Rosalinda Hernandez OT Occupational Therapist        Therapy Charges for Today     Code Description Service Date Service Provider Modifiers Qty    [id number]  OT EVAL MOD COMPLEXITY 3 10/18/2020 Rosalinda Lopez OT GO 1               Rosalinda Lopez OT  10/18/2020

## 2020-10-18 NOTE — PLAN OF CARE
Goal Outcome Evaluation:     Patient disoriented to place, time, situation, oriented to self, pain controlled, ortho to consult tomorrow

## 2020-10-18 NOTE — CONSULTS
Referring Provider: Emergency room  Reason for Consultation: Left shoulder pain    Patient Care Team:  Yamilka Jang APRN as PCP - General (Nurse Practitioner)  Yamilka Jang APRN as PCP - Claims Attributed    Chief complaint pain in left shoulder    Subjective .     History of present illness: This patient is a 89-year-old resident of a nursing home who apparently ambulated with assistance prior to falling yesterday.  She came in because of pain and deformity in her left arm.  It is not known whether she is right or left-handed because of her dementia.  She had no family with her.      History  Past Medical History:   Diagnosis Date   • Chronic back pain    • Dementia (CMS/HCC)    • Diabetes mellitus (CMS/HCC)    • Hypertension    • Subarachnoid hemorrhage (CMS/HCC)    • Subdural hematoma (CMS/HCC)    • TBI (traumatic brain injury) (CMS/HCC)    • Vertebral fracture, osteoporotic (CMS/HCC)        History reviewed. No pertinent surgical history.       Scheduled Meds:    acetaminophen, 650 mg, Oral, BID  amLODIPine, 7.5 mg, Oral, Daily  brimonidine, 1 drop, Both Eyes, BID  cefTRIAXone, 1 g, Intravenous, Q24H  cetirizine, 10 mg, Oral, Daily  docusate sodium, 100 mg, Oral, QAM  dorzolamide-timolol, , Ophthalmic, BID  insulin lispro, 0-9 Units, Subcutaneous, TID AC  latanoprost, 1 drop, Both Eyes, Nightly  pantoprazole, 40 mg, Oral, QAM  saccharomyces boulardii, 250 mg, Oral, Daily  sodium chloride, 10 mL, Intravenous, Q12H         PRN Meds:   •  acetaminophen **OR** acetaminophen **OR** acetaminophen  •  aluminum-magnesium hydroxide-simethicone  •  bisacodyl  •  carboxymethylcellulose sod  •  dextrose  •  dextrose  •  glucagon (human recombinant)  •  haloperidol  •  hyoscyamine  •  insulin lispro **AND** insulin lispro  •  LORazepam  •  magnesium hydroxide  •  magnesium sulfate **OR** magnesium sulfate in D5W 1g/100mL (PREMIX)  •  melatonin  •  nitroglycerin  •  ondansetron **OR** ondansetron  •  polyethylene  glycol  •  potassium chloride  •  prochlorperazine  •  [COMPLETED] Insert peripheral IV **AND** sodium chloride  •  sodium chloride  •  traMADol      Allergies:    Denosumab        Objective     Vital Signs   [unfilled]    Physical Exam:     General Appearance:   She is by her self.  Supine in bed.  Oriented x0.  She is alert.   Extremities:  Left arm is in a sling.  She has a strong radial pulse but it is irregular.  Pain in her left shoulder.  There is no bruising.   Pulses:  As stated the pulses strong but irregular   Skin:  Skin is intact   Neurologic:  She has good movement of her fingers.       Results Review:  CBC    Results from last 7 days   Lab Units 10/17/20  1325   WBC 10*3/mm3 5.30   HEMOGLOBIN g/dL 12.7   PLATELETS 10*3/mm3 175     BMP   Results from last 7 days   Lab Units 10/17/20  1325   SODIUM mmol/L 138   POTASSIUM mmol/L 3.7   CHLORIDE mmol/L 101   CO2 mmol/L 25.0   BUN mg/dL 11   CREATININE mg/dL 0.64   GLUCOSE mg/dL 269*     Infection     Radiology(recent) Xr Shoulder 2+ View Left    Result Date: 10/17/2020   1. Suspicion of fracture of indeterminate age of the proximal metaphysis-head of the left humerus that is difficult to visualize because of the severe osteoporosis. CT left shoulder joint would be helpful to further evaluate this abnormality 2. Fracture of indeterminate age inferior aspect left scapula wing. 3. Fractures of indeterminate age in the left ribs. 4. No acute or focal arising left lung.  Electronically Signed By-DR. Kal Feldman MD On:10/17/2020 1:47 PM This report was finalized on [id number] by DR. Kal Feldman MD.    Xr Humerus Left    Result Date: 10/17/2020   1. Moderately displaced angulated fracture of indeterminate age proximal metaphysis-left head left humerus CT of the left shoulder joint would be helpful to further evaluate this abnormality.  Electronically Signed By-DR. Kal Feldman MD On:10/17/2020 1:52 PM This report was finalized on  [id number] by DR. Kal Feldman MD.    Ct Head Without Contrast    Result Date: 10/17/2020   1. No acute intracranial abnormality seen. 2. No evidence of fracture the skull or facial bones.  Electronically Signed By-DR. Kal Feldman MD On:10/17/2020 2:55 PM This report was finalized on [id number] by DR. Kal Feldman MD.    Ct Cervical Spine Without Contrast    Result Date: 10/17/2020   1. No evidence of fracture or dislocation cervical spine. 2. There are several anterior wedge compression fractures at multiple levels in the superior thoracic spine there are probably old and less likely acute. Clinical correlation physical examination would BE helpful  Electronically Signed By-DR. Kal Feldman MD On:10/17/2020 2:59 PM This report was finalized on [id number] by DR. Kal Feldman MD.    Xr Chest 1 View    Result Date: 10/17/2020   1. Acute fracture of the proximal metaphysis-head left humerus. 2. Old appearing fractures left ribs. 3. A descending thoracic aorta and arch the thoracic aorta are tortuous and dilated. This causes the trachea to be deviated to the right 4. Chronic lung disease. 5. Cardiomegaly  Electronically Signed By-DR. Kal Feldman MD On:10/17/2020 8:31 PM This report was finalized on [id number] by DR. Kal Feldman MD.    Ct Upper Extremity Left Without Contrast    Result Date: 10/17/2020   1. Moderately-severely displaced angulated acute fracture the proximal metaphysis-head of the left humerus without evidence of dislocation of the left glenohumeral shoulder joint. 2. Probably old and less likely acute fracture of the posterior inferior aspect of the left scapular wing. 3. No evidence of acute fracture of the left ribs.  Electronically Signed By-DR. Kal Feldman MD On:10/17/2020 3:05 PM This report was finalized on [id number] by DR. Kal Feldman MD.        I reviewed the patient's new clinical results.  I  reviewed the patient's new imaging results and agree with the interpretation.      Assessment/Plan       Fall    Dementia (CMS/HCC)    Essential hypertension    Personal history of traumatic brain injury    Subarachnoid hemorrhage (CMS/HCC)    Subdural hematoma (CMS/HCC)    Diabetes mellitus, type II (CMS/HCC)    Morbid obesity (CMS/HCC)    GERD (gastroesophageal reflux disease)    Chronic constipation    Anxiety    Hyperglycemia    Left humeral fracture    UTI (urinary tract infection)      Impression moderately displaced fracture of left proximal humerus    Recommendations #1 nonoperative treatment.  We will continue to use the sling and follow in the office in 4 weeks #2 May return to the nursing home anytime from the orthopedic point of view.    I discussed the patients findings and my recommendations with patient    Mike Eduardo MD  10/18/20  06:55 EDT

## 2020-10-18 NOTE — PLAN OF CARE
Goal Outcome Evaluation:  Plan of Care Reviewed With: patient      Pt with few complaints of pain this shift. No surgery planned. Pt was able to get up with PT, can follow simple commands but needs to be prompted multiple times before able to achieve task. Calm and cooperative with care.

## 2020-10-18 NOTE — THERAPY EVALUATION
Patient Name: Carlo Garcia  : 1923    MRN: [id number]                              Today's Date: 10/18/2020       Admit Date: 10/17/2020    Visit Dx:     ICD-10-CM ICD-9-CM   1. Fall, initial encounter  W19.XXXA E888.9   2. Syncope, unspecified syncope type  R55 780.2   3. Contusion of scalp, initial encounter  S00.03XA 920   4. Other closed displaced fracture of proximal end of left humerus, initial encounter  S42.292A 812.09     Patient Active Problem List   Diagnosis   • Fall   • Dementia (CMS/HCC)   • Essential hypertension   • Head injury, closed, initial encounter   • Laceration of skin of forehead   • Acquired scoliosis   • Disorder of lung   • Hemorrhoids   • Increased frequency of urination   • Lung nodule   • Memory loss   • Onychomycosis   • Osteoporosis   • Personal history of (healed) other pathological fracture   • Personal history of traumatic brain injury   • Subarachnoid hemorrhage (CMS/HCC)   • Subdural hematoma (CMS/HCC)   • Diabetes mellitus, type II (CMS/HCC)   • Acute low back pain   • Osteoarthritis of knee   • Morbid obesity (CMS/HCC)   • GERD (gastroesophageal reflux disease)   • Chronic constipation   • Anxiety   • Hyperglycemia   • Left humeral fracture   • UTI (urinary tract infection)     Past Medical History:   Diagnosis Date   • Chronic back pain    • Dementia (CMS/HCC)    • Diabetes mellitus (CMS/HCC)    • Hypertension    • Subarachnoid hemorrhage (CMS/HCC)    • Subdural hematoma (CMS/HCC)    • TBI (traumatic brain injury) (CMS/HCC)    • Vertebral fracture, osteoporotic (CMS/HCC)      History reviewed. No pertinent surgical history.  General Information     Row Name 10/18/20 1017          Physical Therapy Time and Intention    Document Type  evaluation  -     Mode of Treatment  physical therapy  -     Row Name 10/18/20 1017          General Information    Patient Profile Reviewed  yes  -     Prior Level of Function  independent: with walker per facility transfer  report  -     Existing Precautions/Restrictions  fall;non-weight bearing  -     Barriers to Rehab  -- LUE in sling  -[facility] Name 10/18/20 1017          Living Environment    Lives With  facility resident assisted living at [facility]  -[facility] Name 10/18/20 1017          Cognition    Orientation Status (Cognition)  oriented to;person states her first name, pleasantly confused, smiles when asked questions  -[facility] Name 10/18/20 1017          Safety Issues, Functional Mobility    Safety Issues Affecting Function (Mobility)  insight into deficits/self-awareness;judgment;safety precautions follow-through/compliance  -     Impairments Affecting Function (Mobility)  balance;cognition;endurance/activity tolerance;strength;pain;range of motion (ROM);sensation/sensory awareness  -       User Key  (r) = Recorded By, (t) = Taken By, (c) = Cosigned By    Initials Name Provider Type     Josefina Okeefe, PT Physical Therapist        Mobility     [facility] Name 10/18/20 1018          Bed Mobility    Bed Mobility  supine-sit  -     Supine-Sit Badger (Bed Mobility)  moderate assist (50% patient effort)  -     Assistive Device (Bed Mobility)  draw sheet;head of bed elevated  -[facility] Name 10/18/20 1018          Sit-Stand Transfer    Sit-Stand Badger (Transfers)  moderate assist (50% patient effort);2 person assist  -[facility] Name 10/18/20 1018          Gait/Stairs (Locomotion)    [place]ger Level (Gait)  minimum assist (75% patient effort);moderate assist (50% patient effort);2 person assist  -     Distance in Feet (Gait)  gait belt and R side hand held assist, around bed to chair x 10'  -     Deviations/Abnormal Patterns (Gait)  base of support, narrow;festinating/shuffling  -     Bilateral Gait Deviations  forward flexed posture  -[facility] Name 10/18/20 1018          Mobility    Extremity Weight-bearing Status  left upper extremity  -     Left Upper Extremity (Weight-bearing  Status)  non weight-bearing (NWB)  -       User Key  (r) = Recorded By, (t) = Taken By, (c) = Cosigned By    Initials Name Provider Type    Josefina Bacon PT Physical Therapist        Obj/Interventions     Row Name 10/18/20 1019          Range of Motion Comprehensive    Comment, General Range of Motion  BLE AROM WFLs, LUE in sling  -     Row Name 10/18/20 1019          Strength Comprehensive (MMT)    Comment, General Manual Muscle Testing (MMT) Assessment  gross LE observed 3/5, not following directions for formal testing  -     Row Name 10/18/20 1019          Balance    Balance Assessment  sitting static balance;standing static balance;sitting dynamic balance;standing dynamic balance  -     Static Sitting Balance  mild impairment;sitting, edge of bed  -     Dynamic Sitting Balance  sitting, edge of bed;moderate impairment;unsupported  -     Static Standing Balance  moderate impairment  -     Dynamic Standing Balance  moderate impairment  -[facility] Name 10/18/20 1019          Sensory Assessment (Somatosensory)    Sensory Assessment (Somatosensory)  unable/difficult to assess for LUE  -       User Key  (r) = Recorded By, (t) = Taken By, (c) = Cosigned By    Initials Name Provider Type    Josefina Bacon, AD Physical Therapist        Goals/Plan     Row Name 10/18/20 1023          Bed Mobility Goal 1 (PT)    Activity/Assistive Device (Bed Mobility Goal 1, PT)  sit to supine/supine to sit  -     [place] Level/Cues Needed (Bed Mobility Goal 1, PT)  minimum assist (75% or more patient effort);1 person assist  -     Time Frame (Bed Mobility Goal 1, PT)  long term goal (LTG);2 weeks  -[facility] Name 10/18/20 1023          Transfer Goal 1 (PT)    Activity/Assistive Device (Transfer Goal 1, PT)  sit-to-stand/stand-to-sit;bed-to-chair/chair-to-bed  -     [place] Level/Cues Needed (Transfer Goal 1, PT)  contact guard assist  -     Time Frame (Transfer Goal 1, PT)  long term goal (LTG);2  weeks  -[facility] Name 10/18/20 1023          Gait Training Goal 1 (PT)    Activity/Assistive Device (Gait Training Goal 1, PT)  gait (walking locomotion);assistive device use;walker, kelly;cane, straight  -     [place] Level (Gait Training Goal 1, PT)  minimum assist (75% or more patient effort);1 person assist  -     Distance (Gait Training Goal 1, PT)  50'  -     Time Frame (Gait Training Goal 1, PT)  long term goal (LTG);2 weeks  -       User Key  (r) = Recorded By, (t) = Taken By, (c) = Cosigned By    Initials Name Provider Type     Josefina Okeefe, PT Physical Therapist        Clinical Impression     [facility] Name 10/18/20 1020          Pain    Additional Documentation  Pain Scale: FACES Pre/Post-Treatment (Group)  -[facility]     Row Name 10/18/20 1020          Pain Scale: FACES Pre/Post-Treatment    Pain: FACES Scale, Pretreatment  2-->hurts little bit  -     Posttreatment Pain Rating  2-->hurts little bit  -     Pain Location - Side  Left  -     Pain Location - Orientation  upper  -     Pain Location  extremity  -[facility] Name 10/18/20 1020          Plan of Care Review    Plan of Care Reviewed With  patient  -     Outcome Summary  89 yo female assisted living resident admitted after a fall with L humerus fx. Plans for non op treatment in a sling.  Pt is oriented to her name only, pleasantly confused.  Per facility transfer report pt is normally ambulatory with a RW.  Pt required mod A to transfer OOB, min/mod A x2 to ambulate in room, up to chair.  Pt at high risk for falls.  Recommend IP rehab at d/c, not safe for assisted living setting in current condition.  PPE worn:  gloves, mask, goggles.  -[facility] Name 10/18/20 1020          Therapy Assessment/Plan (PT)    Rehab Potential (PT)  good, to achieve stated therapy goals  -     Criteria for Skilled Interventions Met (PT)  yes;skilled treatment is necessary  -[facility] Name 10/18/20 1020          Vital Signs    Posttreatment Heart Rate  (beats/min)  95  -     Pre SpO2 (%)  96  -     O2 Delivery Pre Treatment  room air  -     Intra SpO2 (%)  97  -     O2 Delivery Intra Treatment  room air  -     Post SpO2 (%)  97  -     O2 Delivery Post Treatment  room air  -     Row Name 10/18/20 1020          Positioning and Restraints    Pre-Treatment Position  in bed  -     Post Treatment Position  chair  -     In Chair  notified nsg;sitting;call light within reach;encouraged to call for assist;exit alarm on  -       User Key  (r) = Recorded By, (t) = Taken By, (c) = Cosigned By    Initials Name Provider Type     Josefina Okeefe PT Physical Therapist        Outcome Measures    No documentation.       Physical Therapy Education                 Title: PT OT SLP Therapies (In Progress)     Topic: Physical Therapy (In Progress)     Point: Mobility training (In Progress)     Learning Progress Summary           Patient Acceptance, E, NL by  at 10/18/2020 1024                   Point: Home exercise program (Not Started)     Learner Progress:  Not documented in this visit.          Point: Body mechanics (Not Started)     Learner Progress:  Not documented in this visit.          Point: Precautions (In Progress)     Learning Progress Summary           Patient Acceptance, E, NL by  at 10/18/2020 1024                               User Key     Initials Effective Dates Name Provider Type [facility] 03/01/19 -  Josefina Okeefe PT Physical Therapist PT              PT Recommendation and Plan  Planned Therapy Interventions (PT): balance training, bed mobility training, gait training, transfer training, strengthening, ROM (range of motion)  Plan of Care Reviewed With: patient  Outcome Summary: 89 yo female assisted living resident admitted after a fall with L humerus fx. Plans for non op treatment in a sling.  Pt is oriented to her name only, pleasantly confused.  Per facility transfer report pt is normally ambulatory with a RW.  Pt required mod A  to transfer OOB, min/mod A x2 to ambulate in room, up to chair.  Pt at high risk for falls.  Recommend IP rehab at d/c, not safe for assisted living setting in current condition.  PPE worn:  gloves, mask, goggles.     Time Calculation:   PT Charges     Row Name 10/18/20 1025             Time Calculation    Start Time  0940  -      Stop Time  1000  -      Time Calculation (min)  20 min  -      PT Received On  10/18/20  -      PT - Next Appointment  10/19/20  -      PT Goal Re-Cert Due Date  11/01/20  -         Time Calculation- PT    Total Timed Code Minutes- PT  0 minute(s)  -        User Key  (r) = Recorded By, (t) = Taken By, (c) = Cosigned By    Initials Name Provider Type    Josefina Bacon PT Physical Therapist        Therapy Charges for Today     Code Description Service Date Service Provider Modifiers Qty    44710812725 HC PT EVAL MOD COMPLEXITY 2 10/18/2020 Josefina Okeefe, AD GP 1               Josefina Okeefe PT  10/18/2020

## 2020-10-18 NOTE — PLAN OF CARE
Problem: Adult Inpatient Plan of Care  Goal: Plan of Care Review  Recent Flowsheet Documentation  Taken 10/18/2020 1020 by Josefina Okeefe PT  Plan of Care Reviewed With: patient  Outcome Summary: 89 yo female assisted living resident admitted after a fall with L humerus fx. Plans for non op treatment in a sling.  Pt is oriented to her name only, pleasantly confused.  Per facility transfer report pt is normally ambulatory with a RW.  Pt required mod A to transfer OOB, min/mod A x2 to ambulate in room, up to chair.  Pt at high risk for falls.  Recommend IP rehab at d/c, not safe for assisted living setting in current condition.  PPE worn:  gloves, mask, goggles.

## 2020-10-18 NOTE — PROGRESS NOTES
Discharge Planning Assessment   Rj     Patient Name: Carlo Garcia  MRN: [id number]  Today's Date: 10/18/2020    Admit Date: 10/17/2020      Discharge Plan     Row Name 10/18/20 1128       Plan    Plan  [facility] referral pending    Plan Comments  MSW notified that family is interested in [facility]. MSW sent a referral via Epic.        Continued Care and Services - Admitted Since 10/17/2020     Destination     Service Provider Request Status Selected Services Address Phone Fax    [facility]  Pending - Request Sent N/A [address] [place] [address] [phone]                Linda SHOEMAKERW, LSW  Weekend   Care Management Dept  Cell [phone]  Weekday Department [phone]

## 2020-10-19 VITALS
TEMPERATURE: 98 F | WEIGHT: 107.58 LBS | OXYGEN SATURATION: 98 % | DIASTOLIC BLOOD PRESSURE: 69 MMHG | RESPIRATION RATE: 16 BRPM | SYSTOLIC BLOOD PRESSURE: 126 MMHG | HEART RATE: 84 BPM | HEIGHT: 61 IN | BODY MASS INDEX: 20.31 KG/M2

## 2020-10-19 LAB
BACTERIA SPEC AEROBE CULT: ABNORMAL
GLUCOSE BLDC GLUCOMTR-MCNC: 121 MG/DL (ref 70–105)
GLUCOSE BLDC GLUCOMTR-MCNC: 123 MG/DL (ref 70–105)
GLUCOSE BLDC GLUCOMTR-MCNC: 154 MG/DL (ref 70–105)
HBA1C MFR BLD: 7.9 % (ref 3.5–5.6)
MAGNESIUM SERPL-MCNC: 1.8 MG/DL (ref 1.7–2.3)
POTASSIUM SERPL-SCNC: 3.6 MMOL/L (ref 3.5–5.2)
TROPONIN T SERPL-MCNC: <0.01 NG/ML (ref 0–0.03)
TROPONIN T SERPL-MCNC: <0.01 NG/ML (ref 0–0.03)

## 2020-10-19 PROCEDURE — 83735 ASSAY OF MAGNESIUM: CPT | Performed by: PHYSICIAN ASSISTANT

## 2020-10-19 PROCEDURE — 84484 ASSAY OF TROPONIN QUANT: CPT | Performed by: PHYSICIAN ASSISTANT

## 2020-10-19 PROCEDURE — 82962 GLUCOSE BLOOD TEST: CPT

## 2020-10-19 PROCEDURE — G0378 HOSPITAL OBSERVATION PER HR: HCPCS

## 2020-10-19 PROCEDURE — 99217 PR OBSERVATION CARE DISCHARGE MANAGEMENT: CPT | Performed by: HOSPITALIST

## 2020-10-19 PROCEDURE — 84132 ASSAY OF SERUM POTASSIUM: CPT | Performed by: PHYSICIAN ASSISTANT

## 2020-10-19 RX ORDER — LINEZOLID 600 MG/1
600 TABLET, FILM COATED ORAL 2 TIMES DAILY
Qty: 10 TABLET | Refills: 0 | Status: SHIPPED | OUTPATIENT
Start: 2020-10-19

## 2020-10-19 RX ADMIN — PANTOPRAZOLE SODIUM 40 MG: 40 TABLET, DELAYED RELEASE ORAL at 08:26

## 2020-10-19 RX ADMIN — DOCUSATE SODIUM 100 MG: 100 CAPSULE, LIQUID FILLED ORAL at 08:26

## 2020-10-19 RX ADMIN — TRAMADOL HYDROCHLORIDE 50 MG: 50 TABLET, FILM COATED ORAL at 17:13

## 2020-10-19 RX ADMIN — CETIRIZINE HYDROCHLORIDE 10 MG: 10 TABLET, FILM COATED ORAL at 10:23

## 2020-10-19 RX ADMIN — TIMOLOL MALEATE: 5 SOLUTION/ DROPS OPHTHALMIC at 10:24

## 2020-10-19 RX ADMIN — Medication 250 MG: at 10:22

## 2020-10-19 RX ADMIN — BRIMONIDINE TARTRATE 1 DROP: 2 SOLUTION/ DROPS OPHTHALMIC at 10:24

## 2020-10-19 RX ADMIN — ACETAMINOPHEN 650 MG: 325 TABLET, FILM COATED ORAL at 10:23

## 2020-10-19 RX ADMIN — AMLODIPINE BESYLATE 7.5 MG: 5 TABLET ORAL at 10:23

## 2020-10-19 NOTE — PROGRESS NOTES
[facility] Medicine Services Daily Progress Note      Hospitalist Team  LOS 0 days      Patient Care Team:  Yamilka Jang APRN as PCP - General (Nurse Practitioner)  Yamilka Jang APRN as PCP - Claims Attributed    Patient Location: 4110/1      Subjective   Subjective     Chief Complaint / Subjective  Chief Complaint   Patient presents with   • Fall       Present on Admission:  • Fall  • Dementia (CMS/HCC)  • Essential hypertension  • Diabetes mellitus, type II (CMS/HCC)  • Subdural hematoma (CMS/HCC)  • Subarachnoid hemorrhage (CMS/HCC)  • Morbid obesity (CMS/HCC)  • GERD (gastroesophageal reflux disease)  • Chronic constipation  • Anxiety  • Hyperglycemia  • Left humeral fracture  • UTI (urinary tract infection)      Brief Synopsis of Hospital Course/HPI  Ms. Garcia is a 89 y.o. female presents to [facility] 10/17/2020 complaining of a fall versus possible syncopal episode earlier today.  Patient had an unwitnessed fall but she fell to the ground at the assisted living facility.  It is not clear how the patient had ended up on the ground.  Patient does not remember if he passed out and fell or if she had hit her head.  In the ED, patient had complained of some generalized weakness and headache but otherwise denied any acute complaints.  Upon evaluation, patient was alert and oriented 1-2 which is baseline for the patient.  This was confirmed with family.  Patient is cooperative and able to follow commands but is otherwise a poor historian.  After talking with the patient's daughter, Elodia Blue, it is understood that the patient is a DNR.    Date::    10/18/20  Continues to be pleasantly confused, able to participate some with PT      ROS  Unable to accurately perform ROS due to: dementia     Objective   Objective      Vital Signs  Temp:  [97.8 °F (36.6 °C)-99.6 °F (37.6 °C)] 98.9 °F (37.2 °C)  Heart Rate:  [] 115  Resp:  [13-20] 20  BP: (122-160)/(63-87)  "139/75  Oxygen Therapy  SpO2: 94 %  Device (Oxygen Therapy): room air  Flowsheet Rows      First Filed Value   Admission Height  165.1 cm (65\") Documented at 10/17/2020 1313   Admission Weight  50.8 kg (112 lb) Documented at 10/17/2020 1313        Intake & Output (last 3 days)       10/16 0701 - 10/17 0700 10/17 0701 - 10/18 0700 10/18 0701 - 10/19 0700    P.O.   220    Total Intake(mL/kg)   220 (2.2)    Net   +220           Urine Unmeasured Occurrence  2 x 1 x        Lines, Drains & Airways    Active LDAs     Name:   Placement date:   Placement time:   Site:   Days:    Peripheral IV 10/17/20 1325 Right Antecubital   10/17/20    1325    Antecubital   1                  Physical Exam:    Physical Exam  Physical Exam  Vitals signs and nursing note reviewed.   Constitutional:       General: She is not in acute distress.     Appearance: She is well-developed. She is not diaphoretic.   HENT:      Head: Normocephalic and atraumatic.      Nose: Nose normal.      Mouth/Throat:      Pharynx: No oropharyngeal exudate.   Eyes:      Extraocular Movements: Extraocular movements intact.      Conjunctiva/sclera: Conjunctivae normal.      Pupils: Pupils are equal, round, and reactive to light.   Neck:      Musculoskeletal: Normal range of motion.   Cardiovascular:      Rate and Rhythm: Normal rate and regular rhythm.      Heart sounds: Normal heart sounds.      Comments: S1, S2 audible.  Pulmonary:      Effort: Pulmonary effort is normal. No respiratory distress.      Breath sounds: Normal breath sounds. No wheezing or rales.      Comments: On room air.  Abdominal:      General: Bowel sounds are normal. There is no distension.      Palpations: Abdomen is soft.      Tenderness: There is no abdominal tenderness. There is no guarding or rebound.      Hernia: No hernia is present.   Musculoskeletal:         General: No tenderness or deformity.      Comments: Patient's left arm in sling currently.  Patient complains of pain in her left " shoulder upon movement.   Skin:     General: Skin is warm.      Findings: No erythema or rash.   Neurological:      Mental Status: She is alert and oriented to person, place, and time.      Cranial Nerves: No cranial nerve deficit.   Psychiatric:         Mood and Affect: Mood normal.         Behavior: Behavior normal.     Procedures:              Results Review:     I reviewed the patient's new clinical results.      Lab Results (last 24 hours)     Procedure Component Value Units Date/Time    POC Glucose Once [[id number]]  (Abnormal) Collected: 10/18/20 1536    Specimen: Blood Updated: 10/18/20 1537     Glucose 217 mg/dL      Comment: Serial Number: [id number]:  [id number]       Urine Culture - Urine, Urine, Catheter In/Out [[id number]]  (Abnormal) Collected: 10/17/20 1350    Specimen: Urine, Catheter In/Out Updated: 10/18/20 1222     Urine Culture >100,000 CFU/mL Staphylococcus, coagulase negative     Comment: Call if work-up needed.       POC Glucose Once [[id number]]  (Abnormal) Collected: 10/18/20 1128    Specimen: Blood Updated: 10/18/20 1131     Glucose 165 mg/dL      Comment: Serial Number: [id number]:  [id number]       Basic Metabolic Panel [[id number]]  (Abnormal) Collected: 10/18/20 0650    Specimen: Blood Updated: 10/18/20 0758     Glucose 160 mg/dL      BUN 16 mg/dL      Creatinine 0.79 mg/dL      Sodium 139 mmol/L      Potassium 4.2 mmol/L      Chloride 104 mmol/L      CO2 24.0 mmol/L      Calcium 8.6 mg/dL      eGFR Non African Amer 67 mL/min/1.73      BUN/Creatinine Ratio 20.3     Anion Gap 11.0 mmol/L     Narrative:      GFR Normal >60  Chronic Kidney Disease <60  Kidney Failure <15      Magnesium [[id number]]  (Normal) Collected: 10/18/20 0650    Specimen: Blood Updated: 10/18/20 0758     Magnesium 1.7 mg/dL     Troponin [[id number]]  (Normal) Collected: 10/18/20 0650    Specimen: Blood Updated: 10/18/20 0758     Troponin T <0.010 ng/mL     Narrative:      Troponin T Reference Range:  <= 0.03  ng/mL-   Negative for AMI  >0.03 ng/mL-     Abnormal for myocardial necrosis.  Clinicians would have to utilize clinical acumen, EKG, Troponin and serial changes to determine if it is an Acute Myocardial Infarction or myocardial injury due to an underlying chronic condition.       Results may be falsely decreased if patient taking Biotin.      POC Glucose Once [[id number]]  (Abnormal) Collected: 10/18/20 0747    Specimen: Blood Updated: 10/18/20 0748     Glucose 175 mg/dL      Comment: Serial Number: [id number]:  [id number]       CBC Auto Differential [[id number]]  (Abnormal) Collected: 10/18/20 0650    Specimen: Blood Updated: 10/18/20 0743     WBC 5.90 10*3/mm3      RBC 3.36 10*6/mm3      Hemoglobin 10.1 g/dL      Comment: Result checked         Hematocrit 30.1 %      MCV 89.5 fL      MCH 29.9 pg      MCHC 33.5 g/dL      RDW 14.7 %      RDW-SD 45.9 fl      MPV 8.6 fL      Platelets 165 10*3/mm3      Neutrophil % 66.7 %      Lymphocyte % 23.0 %      Monocyte % 8.8 %      Eosinophil % 0.9 %      Basophil % 0.6 %      Neutrophils, Absolute 3.90 10*3/mm3      Lymphocytes, Absolute 1.30 10*3/mm3      Monocytes, Absolute 0.50 10*3/mm3      Eosinophils, Absolute 0.10 10*3/mm3      Basophils, Absolute 0.00 10*3/mm3      nRBC 0.1 /100 WBC     Troponin [[id number]]  (Normal) Collected: 10/18/20 0031    Specimen: Blood Updated: 10/18/20 0059     Troponin T <0.010 ng/mL     Narrative:      Troponin T Reference Range:  <= 0.03 ng/mL-   Negative for AMI  >0.03 ng/mL-     Abnormal for myocardial necrosis.  Clinicians would have to utilize clinical acumen, EKG, Troponin and serial changes to determine if it is an Acute Myocardial Infarction or myocardial injury due to an underlying chronic condition.       Results may be falsely decreased if patient taking Biotin.      POC Glucose Once [[id number]]  (Abnormal) Collected: 10/17/20 2158    Specimen: Blood Updated: 10/17/20 2200     Glucose 256 mg/dL      Comment: Serial Number:  [id number]:  [id number]           No results found for: HGBA1C                Microbiology Results (last 10 days)     Procedure Component Value - Date/Time    Respiratory Panel PCR w/COVID-19(SARS-CoV-2) [facility] In-House, NP Swab in UTM/VTM, 3-4 HR TAT - Swab, Nasopharynx [[id number]]  (Normal) Collected: 10/17/20 1929    Lab Status: Final result Specimen: Swab from Nasopharynx Updated: 10/17/20 2039     ADENOVIRUS, PCR Not Detected     Coronavirus 229E Not Detected     Coronavirus HKU1 Not Detected     Coronavirus NL63 Not Detected     Coronavirus OC43 Not Detected     COVID19 Not Detected     Human Metapneumovirus Not Detected     Human Rhinovirus/Enterovirus Not Detected     Influenza A PCR Not Detected     Influenza A H1 Not Detected     Influenza A H1 2009 PCR Not Detected     Influenza A H3 Not Detected     Influenza B PCR Not Detected     Parainfluenza Virus 1 Not Detected     Parainfluenza Virus 2 Not Detected     Parainfluenza Virus 3 Not Detected     Parainfluenza Virus 4 Not Detected     RSV, PCR Not Detected     Bordetella pertussis pcr Not Detected     Bordetella parapertussis PCR Not Detected     Chlamydophila pneumoniae PCR Not Detected     Mycoplasma pneumo by PCR Not Detected    Narrative:      Fact sheet for providers: https://docs.[employer]/wp-content/uploads/KQX9713-7118-EG7.1-EUA-Provider-Fact-Sheet-3.pdf    Fact sheet for patients: https://docs.[employer]/wp-content/uploads/GNM9551-4391-GD2.1-EUA-Patient-Fact-Sheet-1.pdf    Urine Culture - Urine, Urine, Catheter In/Out [[id number]]  (Abnormal) Collected: 10/17/20 1350    Lab Status: Preliminary result Specimen: Urine, Catheter In/Out Updated: 10/18/20 1222     Urine Culture >100,000 CFU/mL Staphylococcus, coagulase negative     Comment: Call if work-up needed.             ECG/EMG Results (most recent)     Procedure Component Value Units Date/Time    Adult Transthoracic Echo Complete With Contrast if Necessary Per  Protocol (With Agitated Saline) [[id number]] Collected: 10/18/20 0922     Updated: 10/18/20 1302     BSA 1.9 m^2      RVIDd 2.3 cm      IVSd 1.2 cm      LVIDd 3.6 cm      LVIDs 2.5 cm      LVPWd 0.97 cm      IVS/LVPW 1.3     FS 32.0 %      EDV(Teich) 54.9 ml      ESV(Teich) 21.4 ml      EF(Teich) 61.0 %      EDV(cubed) 47.2 ml      ESV(cubed) 14.9 ml      EF(cubed) 68.5 %      LV mass(C)d 124.0 grams      LV mass(C)dI 64.1 grams/m^2      SV(Teich) 33.5 ml      SI(Teich) 17.3 ml/m^2      SV(cubed) 32.3 ml      SI(cubed) 16.7 ml/m^2      Ao root diam 3.3 cm      Ao root area 8.8 cm^2      ACS 1.9 cm      asc Aorta Diam 3.0 cm      LVOT diam 1.8 cm      LVOT area 2.5 cm^2      RVOT diam 2.3 cm      RVOT area 4.0 cm^2      EDV(MOD-sp4) 42.7 ml      ESV(MOD-sp4) 16.9 ml      EF(MOD-sp4) 60.5 %      SV(MOD-sp4) 25.8 ml      SI(MOD-sp4) 13.4 ml/m^2      Ao root area (BSA corrected) 1.7     LV Sal Vol (BSA corrected) 22.1 ml/m^2      LV Sys Vol (BSA corrected) 8.7 ml/m^2      Aortic R-R 0.65 sec      Aortic HR 92.2 BPM      MV E max divya 86.0 cm/sec      MV A max divya 141.9 cm/sec      MV E/A 0.61     MV V2 max 181.4 cm/sec      MV max PG 13.2 mmHg      MV V2 mean 112.9 cm/sec      MV mean PG 5.8 mmHg      MV V2 VTI 32.2 cm      MVA(VTI) 1.8 cm^2      MV dec slope 702.2 cm/sec^2      MV dec time 0.12 sec      Ao pk divya 156.0 cm/sec      Ao max PG 9.7 mmHg      Ao max PG (full) 1.8 mmHg      Ao V2 mean 122.9 cm/sec      Ao mean PG 6.6 mmHg      Ao mean PG (full) 3.4 mmHg      Ao V2 VTI 31.3 cm      LEANNE(I,A) 1.8 cm^2      LEANNE(I,D) 1.8 cm^2      LEANNE(V,A) 2.3 cm^2      LEANNE(V,D) 2.3 cm^2      AI max divya 400.5 cm/sec      AI max PG 64.2 mmHg      AI dec slope 228.5 cm/sec^2      AI dec time 1.8 sec      AI P1/2t 513.3 msec      LV V1 max PG 7.9 mmHg      LV V1 mean PG 3.2 mmHg      LV V1 max 140.5 cm/sec      LV V1 mean 79.1 cm/sec      LV V1 VTI 22.6 cm      CO(Ao) 25.3 l/min      CI(Ao) 13.1 l/min/m^2      SV(Ao) 275.0 ml       SI(Ao) 142.3 ml/m^2      CO(LVOT) 5.3 l/min      CI(LVOT) 2.7 l/min/m^2      SV(LVOT) 57.4 ml      SV(RVOT) 47.0 ml      SI(LVOT) 29.7 ml/m^2      PA V2 max 81.8 cm/sec      PA max PG 2.7 mmHg      PA max PG (full) 0.22 mmHg      PA V2 mean 48.4 cm/sec      PA mean PG 1.1 mmHg      PA mean PG (full) 0.06 mmHg      PA V2 VTI 11.4 cm      PVA(I,A) 4.1 cm^2       CV ECHO NATY - PVA(I,D) 4.1 cm^2       CV ECHO NATY - PVA(V,A) 3.9 cm^2       CV ECHO NATY - PVA(V,D) 3.9 cm^2      RV V1 max PG 2.5 mmHg      RV V1 mean PG 1.1 mmHg      RV V1 max 78.4 cm/sec      RV V1 mean 45.1 cm/sec      RV V1 VTI 11.7 cm      TR max divya 290.4 cm/sec      RVSP(TR) 36.7 mmHg      RAP systole 3.0 mmHg      Qp/Qs 0.82      CV ECHO NATY - BZI_BMI 42.4 kilograms/m^2       CV ECHO NATY - BSA(HAYCOCK) 2.1 m^2       CV ECHO NATY - BZI_METRIC_WEIGHT 98.4 kg       CV ECHO NATY - BZI_METRIC_HEIGHT 152.4 cm      EF(MOD-bp) 61.0 %      LA dimension(2D) 3.3 cm      Echo EF Estimated 60 %     Narrative:      · Left ventricular wall thickness is consistent with moderate asymmetric   hypertrophy.  · Estimated left ventricular EF = 60% Estimated left ventricular EF was in   agreement with the calculated left ventricular EF. Left ventricular   systolic function is normal.  · The right ventricular cavity is mildly dilated.  · The left atrial cavity is mild to moderately dilated.  · Left ventricular diastolic function is consistent with (grade I)   impaired relaxation.  · Mild to moderate aortic valve regurgitation is present.  · Mild tricuspid valve regurgitation is present.  · Estimated right ventricular systolic pressure from tricuspid   regurgitation is mildly elevated (35-45 mmHg).  · Trace mitral valve regurgitation is present.             Results for orders placed during the hospital encounter of 10/17/20   Bilateral Carotid Duplex    Narrative · Proximal right internal carotid artery plaque without significant   stenosis.  · Proximal  left internal carotid artery plaque without significant   stenosis.          Results for orders placed during the hospital encounter of 10/17/20   Adult Transthoracic Echo Complete With Contrast if Necessary Per Protocol (With Agitated Saline)    Narrative · Left ventricular wall thickness is consistent with moderate asymmetric   hypertrophy.  · Estimated left ventricular EF = 60% Estimated left ventricular EF was in   agreement with the calculated left ventricular EF. Left ventricular   systolic function is normal.  · The right ventricular cavity is mildly dilated.  · The left atrial cavity is mild to moderately dilated.  · Left ventricular diastolic function is consistent with (grade I)   impaired relaxation.  · Mild to moderate aortic valve regurgitation is present.  · Mild tricuspid valve regurgitation is present.  · Estimated right ventricular systolic pressure from tricuspid   regurgitation is mildly elevated (35-45 mmHg).  · Trace mitral valve regurgitation is present.          Xr Shoulder 2+ View Left    Result Date: 10/17/2020   1. Suspicion of fracture of indeterminate age of the proximal metaphysis-head of the left humerus that is difficult to visualize because of the severe osteoporosis. CT left shoulder joint would be helpful to further evaluate this abnormality 2. Fracture of indeterminate age inferior aspect left scapula wing. 3. Fractures of indeterminate age in the left ribs. 4. No acute or focal arising left lung.  Electronically Signed By-DR. Kal Feldman MD On:10/17/2020 1:47 PM This report was finalized on [id number] by DR. Kal Feldman MD.    Xr Humerus Left    Result Date: 10/17/2020   1. Moderately displaced angulated fracture of indeterminate age proximal metaphysis-left head left humerus CT of the left shoulder joint would be helpful to further evaluate this abnormality.  Electronically Signed By-DR. Kal Feldman MD On:10/17/2020 1:52 PM This report was finalized  on [id number] by DR. Kal Feldman MD.    Ct Head Without Contrast    Result Date: 10/17/2020   1. No acute intracranial abnormality seen. 2. No evidence of fracture the skull or facial bones.  Electronically Signed By-DR. Kal Feldman MD On:10/17/2020 2:55 PM This report was finalized on [id number] by DR. Kal Feldman MD.    Ct Cervical Spine Without Contrast    Result Date: 10/17/2020   1. No evidence of fracture or dislocation cervical spine. 2. There are several anterior wedge compression fractures at multiple levels in the superior thoracic spine there are probably old and less likely acute. Clinical correlation physical examination would BE helpful  Electronically Signed By-DR. Kal Feldman MD On:10/17/2020 2:59 PM This report was finalized on [id number] by DR. Kal Feldman MD.    Xr Chest 1 View    Result Date: 10/17/2020   1. Acute fracture of the proximal metaphysis-head left humerus. 2. Old appearing fractures left ribs. 3. A descending thoracic aorta and arch the thoracic aorta are tortuous and dilated. This causes the trachea to be deviated to the right 4. Chronic lung disease. 5. Cardiomegaly  Electronically Signed By-DR. Kal Feldman MD On:10/17/2020 8:31 PM This report was finalized on [id number] by DR. Kal Feldman MD.    Ct Upper Extremity Left Without Contrast    Result Date: 10/17/2020   1. Moderately-severely displaced angulated acute fracture the proximal metaphysis-head of the left humerus without evidence of dislocation of the left glenohumeral shoulder joint. 2. Probably old and less likely acute fracture of the posterior inferior aspect of the left scapular wing. 3. No evidence of acute fracture of the left ribs.  Electronically Signed By-DR. Kal Feldman MD On:10/17/2020 3:05 PM This report was finalized on [id number] by DR. Kal Feldman MD.      Xrays, labs reviewed personally by  physician.    Medication Review:   I have reviewed the patient's current medication list      Scheduled Meds  acetaminophen, 650 mg, Oral, BID  amLODIPine, 7.5 mg, Oral, Daily  brimonidine, 1 drop, Both Eyes, BID  cefTRIAXone, 1 g, Intravenous, Q24H  cetirizine, 10 mg, Oral, Daily  docusate sodium, 100 mg, Oral, QAM  dorzolamide-timolol, , Ophthalmic, BID  insulin lispro, 0-9 Units, Subcutaneous, TID AC  latanoprost, 1 drop, Both Eyes, Nightly  pantoprazole, 40 mg, Oral, QAM  saccharomyces boulardii, 250 mg, Oral, Daily  sodium chloride, 10 mL, Intravenous, Q12H        Meds Infusions       Meds PRN  •  acetaminophen **OR** acetaminophen **OR** acetaminophen  •  aluminum-magnesium hydroxide-simethicone  •  bisacodyl  •  carboxymethylcellulose sod  •  dextrose  •  dextrose  •  glucagon (human recombinant)  •  haloperidol  •  hyoscyamine  •  insulin lispro **AND** insulin lispro  •  LORazepam  •  magnesium hydroxide  •  magnesium sulfate **OR** magnesium sulfate in D5W 1g/100mL (PREMIX)  •  melatonin  •  nitroglycerin  •  ondansetron **OR** ondansetron  •  polyethylene glycol  •  potassium chloride  •  prochlorperazine  •  [COMPLETED] Insert peripheral IV **AND** sodium chloride  •  sodium chloride  •  traMADol      Assessment/Plan   Assessment/Plan     Active Hospital Problems    Diagnosis  POA   • **Fall [W19.XXXA]  Yes   • Morbid obesity (CMS/[facility]) [E66.01]  Yes   • GERD (gastroesophageal reflux disease) [K21.9]  Yes   • Chronic constipation [K59.09]  Yes   • Anxiety [F41.9]  Yes   • Hyperglycemia [R73.9]  Yes   • Left humeral fracture [S42.302A]  Yes   • UTI (urinary tract infection) [N39.0]  Yes   • Dementia (CMS/HCC) [F03.90]  Yes   • Essential hypertension [I10]  Yes   • Subdural hematoma (CMS/HCC) [S06.5X9A]  Yes   • Subarachnoid hemorrhage (CMS/[facility]) [I60.9]  Yes   • Personal history of traumatic brain injury [Z87.820]  Not Applicable   • Diabetes mellitus, type II (CMS/HCC) [E11.9]  Yes      Resolved Hospital  Problems   No resolved problems to display.     Unwitnessed fall   -Unwitnessed fall earlier today at assisted living facility, unclear if patient had a syncopal episode versus underlying infection versus mechanical fall as patient is poor historian with underlying dementia but is seemingly at baseline mentation after discussion with family  -Patient is afebrile, pulse in the 80s, on room air oxygen and 99% SPO2 and blood pressure 130s over 90s.    -Patient given morphine 2 mg x 2, Rocephin, Zofran, orthopedic consult.  EKG pending.   -Check chest x-ray, TSH, echo, carotid Dopplers, serial troponins, COVID-19 swab  -Fall precautions, cardiac monitoring  -PT/OT consult  -Diabetic diet, advance as tolerated     UTI  -Continue Rocephin     Left humeral fracture  -Orthopedic surgery consult, non-operative management planned   -Pain control tramadol  -Diabetic diet until n.p.o. midnight     Diabetes mellitus type 2 with hyperglycemia  -glucose of 269,  -SSI, Accu-Cheks 3 times daily before meals  -Check A1c  -Hold home Metformin     Dementia  -Seemingly at baseline mentation after discussion with family  -Continue home Haldol as needed for agitation     History of subdural hematoma, subarachnoid hemorrhage, personal history of traumatic brain injury     Anxiety  -Continue home Ativan as needed, inspect verified     Essential hypertension, chronic, moderately controlled  -Continue home Norvasc     Chronic constipation  -Continue home MiraLAX, Colace     GERD  -Continue home PPI     Morbid obesity, BMI 45.64  -Encourage lifestyle modifications    Code Status -   Code Status and Medical Interventions:   Ordered at: 10/17/20 4780     Limited Support to NOT Include:    Other     Level Of Support Discussed With:    Health Care Surrogate     Code Status:    No CPR     Medical Interventions (Level of Support Prior to Arrest):    Limited     Limited Support to Also NOT Include:    no cpr       Discharge Planning  Pending  placement IP rehab    Continued Care and Services - Admitted Since 10/17/2020     Destination     Service Provider Request Status Selected Services Address Phone Fax    [facility]  P[facility] - Request Sent N/A [address] [place] [id number] [phone]                  Electronically signed by Roxanne Hough MD, 10/18/20, 21:42 EDT.  [facility] Hospitalist Team

## 2020-10-19 NOTE — PROGRESS NOTES
Continued Stay Note   Rj     Patient Name: Carlo Garcia  MRN: [id number]  Today's Date: 10/19/2020    Admit Date: 10/17/2020    Discharge Plan     Row Name 10/19/20 1606       Plan    Plan  DC Plan: [facility] accepted. [facility] approved. No precert required.    Plan Comments  [facility] denied citing [facility] cannot accomodate pt needs. SW met with pt daughter at pt bedside (wearing appropriate PPE, 6 FT). Pt daughter notified that [facility] cannot accept. Daughter [facility].    Row Name 10/19/20 1430               Discharge Codes    No documentation.       Expected Discharge Date and Time     Expected Discharge Date Expected Discharge Time    Oct 19, 2020         [facility]    Phone # [phone]  Cell #[phone]  Fax#[phone]  [email or web address]      [facility]

## 2020-10-19 NOTE — DISCHARGE SUMMARY
[facility]  DISCHARGE SUMMARY        Prepared For PCP:  Yamilka Jang APRN    Patient Name: Carlo Garcia  : 1923  MRN: [id number]      Date of Admission:   10/17/2020    Date of Discharge:  10/19/2020    Length of stay:  LOS: 0 days     Hospital Course     Presenting Problem:   Fall, initial encounter [W19.XXXA]  Contusion of scalp, initial encounter [S00.03XA]  Syncope, unspecified syncope type [R55]  Other closed displaced fracture of proximal end of left humerus, initial encounter [S42.292A]      Active Hospital Problems    Diagnosis  POA   • **Fall [W19.XXXA]  Yes   • Morbid obesity (CMS/HCC) [E66.01]  Yes   • GERD (gastroesophageal reflux disease) [K21.9]  Yes   • Chronic constipation [K59.09]  Yes   • Anxiety [F41.9]  Yes   • Hyperglycemia [R73.9]  Yes   • Left humeral fracture [S42.302A]  Yes   • UTI (urinary tract infection) [N39.0]  Yes   • Dementia (CMS/HCC) [F03.90]  Yes   • Essential hypertension [I10]  Yes   • Subdural hematoma (CMS/HCC) [S06.5X9A]  Yes   • Subarachnoid hemorrhage (CMS/HCC) [I60.9]  Yes   • Personal history of traumatic brain injury [Z87.820]  Not Applicable   • Diabetes mellitus, type II (CMS/HCC) [E11.9]  Yes      Resolved Hospital Problems   No resolved problems to display.           Hospital Course by problem list .    Unwitnessed fall likely mechanical nature in together with possible contributed by UTI.  -Patient is afebrile, pulse in the 80s, on room air oxygen and 99% SPO2 and blood pressure 130s over 90s.    -Patient given morphine 2 mg x 2, Rocephin, Zofran, orthopedic consult.  EKG pending.   -Carotid doppler revealed no significant stenotic lesion., COVID-19 swab negative.  -Fall precautions, cardiac monitoring  -PT/OT consult  -Diabetic diet, advance as tolerated     UTI  -Continue Rocephin  - culture revealed staph coagulase negative ... will be discharge on zyvox orally for five days.     Left humeral  fracture  -Orthopedic surgery consulted and advised  #1 nonoperative treatment.  We will continue to use the sling and follow in the office in 4 weeks #2 May return to the nursing home anytime from the orthopedic point of view.     Diabetes mellitus type 2 with hyperglycemia  Resume home regime.     Dementia  -Seemingly at baseline mentation after discussion with family  -Continue home Haldol as needed for agitation     History of subdural hematoma, subarachnoid hemorrhage, personal history of traumatic brain injury     Anxiety  -Continue home Ativan as needed, inspect verified     Essential hypertension, chronic, moderately controlled  -Continue home Norvasc     Chronic constipation  -Continue home MiraLAX, Colace     GERD  -Continue home PPI     Morbid obesity, BMI 45.64  -Encourage lifestyle modifications     Disposition to rehab once arranged.       Recommendation for Outpatient Providers:     Follow up with FP in a week time.  Follow up with Orthopaedic service Dr. Eduardo in two week time.  Fall precaution.      Reasons For Change In Medications and Indications for New Medications:        Day of Discharge     HPI:       Vital Signs:   Temp:  [97.8 °F (36.6 °C)-99.6 °F (37.6 °C)] 98 °F (36.7 °C)  Heart Rate:  [] 84  Resp:  [16-20] 16  BP: (122-146)/(63-79) 126/69     Physical Exam:  Physical Exam  Vitals signs and nursing note reviewed.   Constitutional:       General: She is not in acute distress.     Appearance: Normal appearance. She is well-developed. She is obese. She is not ill-appearing, toxic-appearing or diaphoretic.   HENT:      Head: Normocephalic and atraumatic.      Right Ear: Ear canal and external ear normal.      Left Ear: Ear canal and external ear normal.      Nose: Nose normal. No congestion or rhinorrhea.      Mouth/Throat:      Mouth: Mucous membranes are moist.      Pharynx: No oropharyngeal exudate.   Eyes:      General: No scleral icterus.        Right eye: No discharge.         Left  eye: No discharge.      Extraocular Movements: Extraocular movements intact.      Conjunctiva/sclera: Conjunctivae normal.      Pupils: Pupils are equal, round, and reactive to light.   Neck:      Musculoskeletal: Normal range of motion and neck supple. No neck rigidity or muscular tenderness.      Thyroid: No thyromegaly.      Vascular: No carotid bruit or JVD.      Trachea: No tracheal deviation.   Cardiovascular:      Rate and Rhythm: Normal rate and regular rhythm.      Pulses: Normal pulses.      Heart sounds: Normal heart sounds. No murmur. No friction rub. No gallop.    Pulmonary:      Effort: Pulmonary effort is normal. No respiratory distress.      Breath sounds: Normal breath sounds. No stridor. No wheezing, rhonchi or rales.   Chest:      Chest wall: No tenderness.   Abdominal:      General: Bowel sounds are normal. There is no distension.      Palpations: Abdomen is soft. There is no mass.      Tenderness: There is no abdominal tenderness. There is no guarding or rebound.      Hernia: No hernia is present.   Musculoskeletal: Normal range of motion.         General: No swelling, tenderness, deformity or signs of injury.      Right lower leg: No edema.      Left lower leg: No edema.   Lymphadenopathy:      Cervical: No cervical adenopathy.   Skin:     General: Skin is warm and dry.      Coloration: Skin is not jaundiced or pale.      Findings: No bruising, erythema or rash.   Neurological:      General: No focal deficit present.      Mental Status: She is alert. Mental status is at baseline.      Cranial Nerves: No cranial nerve deficit.      Sensory: No sensory deficit.      Motor: No weakness or abnormal muscle tone.      Coordination: Coordination normal.   Psychiatric:         Mood and Affect: Mood normal.         Behavior: Behavior normal.         Thought Content: Thought content normal.         Judgment: Judgment normal.         Pertinent  and/or Most Recent Results     Results from last 7 days   Lab  Units 10/19/20  0130 10/18/20  0650 10/17/20  1325   WBC 10*3/mm3  --  5.90 5.30   HEMOGLOBIN g/dL  --  10.1* 12.7   HEMATOCRIT %  --  30.1* 37.9   PLATELETS 10*3/mm3  --  165 175   SODIUM mmol/L  --  139 138   POTASSIUM mmol/L 3.6 4.2 3.7   CHLORIDE mmol/L  --  104 101   CO2 mmol/L  --  24.0 25.0   BUN mg/dL  --  16 11   CREATININE mg/dL  --  0.79 0.64   GLUCOSE mg/dL  --  160* 269*   CALCIUM mg/dL  --  8.6 9.3     Results from last 7 days   Lab Units 10/17/20  1325   BILIRUBIN mg/dL 0.3   ALK PHOS U/L 140*   ALT (SGPT) U/L 11   AST (SGOT) U/L 18           Invalid input(s): TG, LDLCALC, LDLREALC  Results from last 7 days   Lab Units 10/19/20  0643 10/19/20  0130 10/18/20  0650 10/18/20  0031 10/17/20  1924   TSH uIU/mL  --   --   --   --  3.490   HEMOGLOBIN A1C %  --   --   --   --  7.9*   TROPONIN T ng/mL <0.010 <0.010 <0.010 <0.010 <0.010       Brief Urine Lab Results  (Last result in the past 365 days)      Color   Clarity   Blood   Leuk Est   Nitrite   Protein   CREAT   Urine HCG        10/17/20 1350 Yellow Clear Negative Negative Positive Negative               Microbiology Results Abnormal     Procedure Component Value - Date/Time    Urine Culture - Urine, Urine, Catheter In/Out [[id number]]  (Abnormal) Collected: 10/17/20 1350    Lab Status: Final result Specimen: Urine, Catheter In/Out Updated: 10/19/20 1142     Urine Culture >100,000 CFU/mL Staphylococcus, coagulase negative     Comment: Call if work-up needed.       Respiratory Panel PCR w/COVID-19(SARS-CoV-2) [facility] In-House, NP Swab in UTM/VTM, 3-4 HR TAT - Swab, Nasopharynx [[id number]]  (Normal) Collected: 10/17/20 1929    Lab Status: Final result Specimen: Swab from Nasopharynx Updated: 10/17/20 2039     ADENOVIRUS, PCR Not Detected     Coronavirus 229E Not Detected     Coronavirus HKU1 Not Detected     Coronavirus NL63 Not Detected     Coronavirus OC43 Not Detected     COVID19 Not Detected     Human Metapneumovirus Not Detected      Human Rhinovirus/Enterovirus Not Detected     Influenza A PCR Not Detected     Influenza A H1 Not Detected     Influenza A H1 2009 PCR Not Detected     Influenza A H3 Not Detected     Influenza B PCR Not Detected     Parainfluenza Virus 1 Not Detected     Parainfluenza Virus 2 Not Detected     Parainfluenza Virus 3 Not Detected     Parainfluenza Virus 4 Not Detected     RSV, PCR Not Detected     Bordetella pertussis pcr Not Detected     Bordetella parapertussis PCR Not Detected     Chlamydophila pneumoniae PCR Not Detected     Mycoplasma pneumo by PCR Not Detected    Narrative:      Fact sheet for providers: https://docs.[employer]/wp-content/uploads/GTI3361-2442-IG3.1-EUA-Provider-Fact-Sheet-3.pdf    Fact sheet for patients: https://docs.[employer]/wp-content/uploads/ENP6977-8346-ZK6.1-EUA-Patient-Fact-Sheet-1.pdf          Xr Shoulder 2+ View Left    Result Date: 10/17/2020  Impression:  1. Suspicion of fracture of indeterminate age of the proximal metaphysis-head of the left humerus that is difficult to visualize because of the severe osteoporosis. CT left shoulder joint would be helpful to further evaluate this abnormality 2. Fracture of indeterminate age inferior aspect left scapula wing. 3. Fractures of indeterminate age in the left ribs. 4. No acute or focal arising left lung.  Electronically Signed By-DR. Kal Feldman MD On:10/17/2020 1:47 PM This report was finalized on [id number] by DR. Kal Feldman MD.    Xr Humerus Left    Result Date: 10/17/2020  Impression:  1. Moderately displaced angulated fracture of indeterminate age proximal metaphysis-left head left humerus CT of the left shoulder joint would be helpful to further evaluate this abnormality.  Electronically Signed By-DR. Kal Feldman MD On:10/17/2020 1:52 PM This report was finalized on [id number] by DR. Kal Feldman MD.    Ct Head Without Contrast    Result Date: 10/17/2020  Impression:  1. No acute  intracranial abnormality seen. 2. No evidence of fracture the skull or facial bones.  Electronically Signed By-DR. Kal Feldman MD On:10/17/2020 2:55 PM This report was finalized on [id number] by DR. Kal Feldman MD.    Ct Cervical Spine Without Contrast    Result Date: 10/17/2020  Impression:  1. No evidence of fracture or dislocation cervical spine. 2. There are several anterior wedge compression fractures at multiple levels in the superior thoracic spine there are probably old and less likely acute. Clinical correlation physical examination would BE helpful  Electronically Signed By-DR. Kal Feldman MD On:10/17/2020 2:59 PM This report was finalized on [id number] by DR. Kal Feldman MD.    Xr Chest 1 View    Result Date: 10/17/2020  Impression:  1. Acute fracture of the proximal metaphysis-head left humerus. 2. Old appearing fractures left ribs. 3. A descending thoracic aorta and arch the thoracic aorta are tortuous and dilated. This causes the trachea to be deviated to the right 4. Chronic lung disease. 5. Cardiomegaly  Electronically Signed By-DR. Kal Feldman MD On:10/17/2020 8:31 PM This report was finalized on [id number] by DR. Kal Feldman MD.    Ct Upper Extremity Left Without Contrast    Result Date: 10/17/2020  Impression:  1. Moderately-severely displaced angulated acute fracture the proximal metaphysis-head of the left humerus without evidence of dislocation of the left glenohumeral shoulder joint. 2. Probably old and less likely acute fracture of the posterior inferior aspect of the left scapular wing. 3. No evidence of acute fracture of the left ribs.  Electronically Signed By-DR. Kal Feldman MD On:10/17/2020 3:05 PM This report was finalized on [id number] by DR. Kal Feldman MD.      Results for orders placed during the hospital encounter of 10/17/20   Bilateral Carotid Duplex    Narrative · Proximal right internal  carotid artery plaque without significant   stenosis.  · Proximal left internal carotid artery plaque without significant   stenosis.          Results for orders placed during the hospital encounter of 10/17/20   Bilateral Carotid Duplex    Narrative · Proximal right internal carotid artery plaque without significant   stenosis.  · Proximal left internal carotid artery plaque without significant   stenosis.          Results for orders placed during the hospital encounter of 10/17/20   Adult Transthoracic Echo Complete With Contrast if Necessary Per Protocol (With Agitated Saline)    Narrative · Left ventricular wall thickness is consistent with moderate asymmetric   hypertrophy.  · Estimated left ventricular EF = 60% Estimated left ventricular EF was in   agreement with the calculated left ventricular EF. Left ventricular   systolic function is normal.  · The right ventricular cavity is mildly dilated.  · The left atrial cavity is mild to moderately dilated.  · Left ventricular diastolic function is consistent with (grade I)   impaired relaxation.  · Mild to moderate aortic valve regurgitation is present.  · Mild tricuspid valve regurgitation is present.  · Estimated right ventricular systolic pressure from tricuspid   regurgitation is mildly elevated (35-45 mmHg).  · Trace mitral valve regurgitation is present.                  Test Results Pending at Discharge        Procedures Performed           Consults:   Consults     Date and Time Order Name Status Description    10/17/2020 1843 Inpatient Orthopedic Surgery Consult Completed     10/17/2020 1551 Ortho (on-call MD unless specified)      10/17/2020 1539 Hospitalist (on-call MD unless specified) Completed             Discharge Details        Discharge Medications      New Medications      Instructions Start Date   linezolid 600 MG tablet  Commonly known as: Zyvox   600 mg, Oral, 2 Times Daily         Continue These Medications      Instructions Start Date    acetaminophen 500 MG tablet  Commonly known as: TYLENOL   500 mg, Oral, Every 6 Hours PRN      acetaminophen 325 MG tablet  Commonly known as: TYLENOL   650 mg, Oral, 2 Times Daily      amLODIPine 5 MG tablet  Commonly known as: NORVASC   7.5 mg, Oral, Daily      bimatoprost 0.01 % ophthalmic drops  Commonly known as: LUMIGAN   1 drop, Both Eyes, Nightly      brimonidine 0.2 % ophthalmic solution  Commonly known as: ALPHAGAN   1 drop, Both Eyes, 2 Times Daily      Cholecalciferol 50 MCG (2000 UT) capsule   2,000 Units, Oral, Every Morning      docusate sodium 100 MG capsule  Commonly known as: COLACE   100 mg, Oral, Every Morning      dorzolamide-timolol 22.3-6.8 MG/ML ophthalmic solution  Commonly known as: COSOPT   1 drop, Both Eyes, 2 Times Daily      haloperidol 2 MG/ML solution  Commonly known as: HALDOL   1 mg, Oral, Every 6 Hours PRN      hydrocortisone 2.5 % rectal cream  Commonly known as: ANUSOL-HC   1 application, Rectal, 2 Times Daily PRN      hyoscyamine 0.125 MG SL tablet  Commonly known as: LEVSIN   0.125 mg, Oral, Every 4 Hours PRN      loratadine 10 MG tablet  Commonly known as: CLARITIN   10 mg, Oral, Daily      LORazepam 0.5 MG tablet  Commonly known as: ATIVAN   0.5 mg, Oral, Every 6 Hours PRN      metFORMIN  MG 24 hr tablet  Commonly known as: GLUCOPHAGE-XR   500 mg, Oral, Nightly      omeprazole 40 MG capsule  Commonly known as: priLOSEC   40 mg, Oral, Daily      polyethylene glycol packet  Commonly known as: MIRALAX   17 g, Oral, Daily PRN      prochlorperazine 10 MG tablet  Commonly known as: COMPAZINE   10 mg, Oral, Every 6 Hours PRN      Refresh Tears 0.5 % solution  Generic drug: carboxymethylcellulose   1 drop, Both Eyes, 4 Times Daily, (08:00,12:00,16:00,20:00)       saccharomyces boulardii 250 MG capsule  Commonly known as: FLORASTOR   250 mg, Oral, Daily             Allergies   Allergen Reactions   • Denosumab Itching         Discharge Disposition:  Rehab Facility or Unit (DC -  External)    Diet:  Hospital:  Diet Order   Procedures   • Diet Diabetic/Consistent Carbs; Diabetic - Consistent Carb         Discharge Activity:         CODE STATUS:    Code Status and Medical Interventions:   Ordered at: 10/17/20 1820     Limited Support to NOT Include:    Other     Level Of Support Discussed With:    Health Care Surrogate     Code Status:    No CPR     Medical Interventions (Level of Support Prior to Arrest):    Limited     Limited Support to Also NOT Include:    no cpr         Follow-up Appointments  No future appointments.          Condition on Discharge:      Stable      This patient has been examined wearing appropriate Personal Protective Equipment and discussed with hospital infection control department. 10/19/20      Electronically signed by Cristobal Hart MD, 10/19/20, 1:35 PM EDT.      Time: I spent  25  minutes on this discharge activity which included face-to-face encounter with the patient/reviewing the data in the system/coordination of the care with the nursing staff as well as consultants/documentation/entering orders.

## 2020-10-19 NOTE — PLAN OF CARE
Goal Outcome Evaluation:  Plan of Care Reviewed With: patient  Progress: improving  Outcome Summary: Pt without significant c/o this shift. Pt continuing to require assistance with feedings. Nursing providing frequent weight shifts. Family has remained at bedside. VSS. Pt to dc to [facility] today.

## 2020-10-19 NOTE — PLAN OF CARE
Goal Outcome Evaluation:  Plan of Care Reviewed With: patient  Progress: improving  Outcome Summary: Patient confused but able to converse at times. Requires assistance with feeding. Resting at this time. Turn Q2.  Awaiting acceptance to [facility]. plan of care is ongoing.

## 2020-10-20 NOTE — PROGRESS NOTES
Case Management Discharge Note      Final Note: [facility] accepted. [facility] approved. No precert required         Selected Continued Care - Discharged on 10/19/2020 Admission date: 10/17/2020 - Discharge disposition: Rehab Facility or Unit (DC - External)    Destination Coordination complete    Service Provider Selected Services Address Phone Fax    [facility] [address] [facility] [place] [address] [phone]                       Final Discharge Disposition Code: 03 - skilled nursing facility (SNF)